# Patient Record
Sex: FEMALE | Race: WHITE | Employment: UNEMPLOYED | URBAN - METROPOLITAN AREA
[De-identification: names, ages, dates, MRNs, and addresses within clinical notes are randomized per-mention and may not be internally consistent; named-entity substitution may affect disease eponyms.]

---

## 2018-07-12 ENCOUNTER — OFFICE VISIT (OUTPATIENT)
Dept: INTERNAL MEDICINE CLINIC | Facility: CLINIC | Age: 18
End: 2018-07-12

## 2018-07-12 VITALS
RESPIRATION RATE: 14 BRPM | BODY MASS INDEX: 25.48 KG/M2 | DIASTOLIC BLOOD PRESSURE: 58 MMHG | HEIGHT: 69 IN | HEART RATE: 62 BPM | WEIGHT: 172 LBS | SYSTOLIC BLOOD PRESSURE: 104 MMHG | TEMPERATURE: 98 F

## 2018-07-12 DIAGNOSIS — N94.6 DYSMENORRHEA: Primary | ICD-10-CM

## 2018-07-12 PROCEDURE — 99203 OFFICE O/P NEW LOW 30 MIN: CPT | Performed by: INTERNAL MEDICINE

## 2018-07-12 RX ORDER — NORETHINDRONE ACETATE AND ETHINYL ESTRADIOL 1MG-20(21)
1 KIT ORAL DAILY
Qty: 3 PACKAGE | Refills: 3 | Status: SHIPPED | OUTPATIENT
Start: 2018-07-12 | End: 2019-06-18

## 2018-07-12 NOTE — PROGRESS NOTES
Cora Rivas is a 21year old female. Patient presents with:  Establish Care: AB RM 4 menstrual cramps      HPI:     Patient here for OCP for menstrual cramps. Usually the first few days is severe and then after that, pain is manageable.  She heard that rashes,no suspicious lesions  HEENT: atraumatic, normocephalic  NECK: supple,no adenopathy  LUNGS: normal rate without respiratory distress, lungs clear to auscultation  CARDIO: RRR nl S1 S2  GI: normal bowel sounds, no masses, HSM or tenderness  EXTREMITI

## 2019-06-17 ENCOUNTER — TELEPHONE (OUTPATIENT)
Dept: INTERNAL MEDICINE CLINIC | Facility: CLINIC | Age: 19
End: 2019-06-17

## 2019-06-17 NOTE — TELEPHONE ENCOUNTER
Future Appointments   Date Time Provider La Acevedo   7/2/2019  9:30 AM Lorena Aguirre MD [facility]     Pt has CPE with TB and would like BW orders sent to [facility].

## 2019-06-18 DIAGNOSIS — N94.6 DYSMENORRHEA: ICD-10-CM

## 2019-06-18 RX ORDER — NORETHINDRONE ACETATE AND ETHINYL ESTRADIOL AND FERROUS FUMARATE 1MG-20(21)
KIT ORAL
Qty: 84 TABLET | Refills: 0 | Status: SHIPPED | OUTPATIENT
Start: 2019-06-18 | End: 2019-07-02

## 2019-06-18 RX ORDER — NORETHINDRONE ACETATE AND ETHINYL ESTRADIOL 1MG-20(21)
1 KIT ORAL DAILY
Qty: 3 PACKAGE | Refills: 3 | OUTPATIENT
Start: 2019-06-18

## 2019-06-18 NOTE — TELEPHONE ENCOUNTER
Protocol failed due to PASS-PENDING LAST PAP WNL--VIA MANUAL LOOKUP    Please advise,    LOV:7/12/18 TB  FOV:7/2/19   LAST RX:7/12/18 1-20 mg-mcg take 1 tab daily 3 package 3 refills   LAST LABS:none   PER PROTOCOL: to provider

## 2019-07-02 ENCOUNTER — OFFICE VISIT (OUTPATIENT)
Dept: INTERNAL MEDICINE CLINIC | Facility: CLINIC | Age: 19
End: 2019-07-02
Payer: COMMERCIAL

## 2019-07-02 VITALS
HEIGHT: 69 IN | SYSTOLIC BLOOD PRESSURE: 110 MMHG | RESPIRATION RATE: 14 BRPM | HEART RATE: 84 BPM | WEIGHT: 168 LBS | BODY MASS INDEX: 24.88 KG/M2 | DIASTOLIC BLOOD PRESSURE: 68 MMHG

## 2019-07-02 DIAGNOSIS — Z00.00 ROUTINE GENERAL MEDICAL EXAMINATION AT A HEALTH CARE FACILITY: Primary | ICD-10-CM

## 2019-07-02 DIAGNOSIS — Z11.8 SCREENING FOR CHLAMYDIAL DISEASE: ICD-10-CM

## 2019-07-02 DIAGNOSIS — N94.6 DYSMENORRHEA: ICD-10-CM

## 2019-07-02 PROCEDURE — 87491 CHLMYD TRACH DNA AMP PROBE: CPT | Performed by: INTERNAL MEDICINE

## 2019-07-02 PROCEDURE — 99395 PREV VISIT EST AGE 18-39: CPT | Performed by: INTERNAL MEDICINE

## 2019-07-02 PROCEDURE — 87591 N.GONORRHOEAE DNA AMP PROB: CPT | Performed by: INTERNAL MEDICINE

## 2019-07-02 RX ORDER — NORETHINDRONE ACETATE AND ETHINYL ESTRADIOL 1MG-20(21)
1 KIT ORAL
Qty: 3 PACKAGE | Refills: 3 | Status: SHIPPED | OUTPATIENT
Start: 2019-07-02 | End: 2020-05-20

## 2019-07-02 NOTE — PROGRESS NOTES
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  Disp: 3 Package Rfl: 3       Allergies  No Known Allergies    Health Maintenance  Immunizations:    Immunization H given  Screening for chlamydial disease      Orders Placed This Encounter      Chlamydia/Gc Amplification [E]      Meds & Refills for this Visit:  Requested Prescriptions     Signed Prescriptions Disp Refills   • Norethin Ace-Eth Estrad-FE (Ernesto Rising FE 1/20)

## 2019-07-03 LAB
C TRACH DNA SPEC QL NAA+PROBE: NEGATIVE
N GONORRHOEA DNA SPEC QL NAA+PROBE: NEGATIVE

## 2019-07-05 ENCOUNTER — TELEPHONE (OUTPATIENT)
Dept: INTERNAL MEDICINE CLINIC | Facility: CLINIC | Age: 19
End: 2019-07-05

## 2019-07-05 DIAGNOSIS — Z13.21 SCREENING FOR ENDOCRINE, NUTRITIONAL, METABOLIC AND IMMUNITY DISORDER: ICD-10-CM

## 2019-07-05 DIAGNOSIS — Z13.29 SCREENING FOR THYROID DISORDER: ICD-10-CM

## 2019-07-05 DIAGNOSIS — Z13.228 SCREENING FOR ENDOCRINE, NUTRITIONAL, METABOLIC AND IMMUNITY DISORDER: ICD-10-CM

## 2019-07-05 DIAGNOSIS — Z13.0 SCREENING FOR ENDOCRINE, NUTRITIONAL, METABOLIC AND IMMUNITY DISORDER: ICD-10-CM

## 2019-07-05 DIAGNOSIS — Z13.220 ENCOUNTER FOR SCREENING FOR LIPID DISORDER: Primary | ICD-10-CM

## 2019-07-05 DIAGNOSIS — Z13.0 ENCOUNTER FOR SCREENING FOR DISEASES OF THE BLOOD AND BLOOD-FORMING ORGANS AND CERTAIN DISORDERS INVOLVING THE IMMUNE MECHANISM: ICD-10-CM

## 2019-07-05 DIAGNOSIS — Z13.29 SCREENING FOR ENDOCRINE, NUTRITIONAL, METABOLIC AND IMMUNITY DISORDER: ICD-10-CM

## 2019-07-05 NOTE — TELEPHONE ENCOUNTER
Called and spoke with pt's [facility] to inform, per TB, fasting CPE labs ordered as requested. To fast 12hrs prior to blood draw. Mother verbalized understanding and agreed with POC.

## 2019-07-05 NOTE — TELEPHONE ENCOUNTER
Per TB OV notes 7/2/19: Routine general medical examination at a health care facility  (primary encounter diagnosis)- Patient counseled on safe sex practices, seatbelt use, substance abuse, healthy diet and regular exercise. Check screen for chlamydia.  Dis

## 2019-07-05 NOTE — TELEPHONE ENCOUNTER
Pt mother Raymon Carlson called to let TB know that she discussed getting labs done for the patient and they wanted to let you know that if you think there are labs that she needs to do at this age ok to order them-just call mom back to let her know to get

## 2019-07-06 ENCOUNTER — TELEPHONE (OUTPATIENT)
Dept: INTERNAL MEDICINE CLINIC | Facility: CLINIC | Age: 19
End: 2019-07-06

## 2020-05-20 DIAGNOSIS — N94.6 DYSMENORRHEA: ICD-10-CM

## 2020-05-21 RX ORDER — NORETHINDRONE ACETATE AND ETHINYL ESTRADIOL 1MG-20(21)
1 KIT ORAL
Qty: 3 PACKAGE | Refills: 0 | Status: SHIPPED | OUTPATIENT
Start: 2020-05-21 | End: 2020-08-07

## 2020-05-21 NOTE — TELEPHONE ENCOUNTER
Last Ov: 7/2/19, TB, CPE  Last labs: G/C urine 7/2/19  Last Rx: Junel FE 1/20, #3 pack, 3R 7/2/19    No future appointments. Per Protocol - failed. Rx pending.

## 2020-08-07 DIAGNOSIS — N94.6 DYSMENORRHEA: ICD-10-CM

## 2020-08-07 RX ORDER — NORETHINDRONE ACETATE AND ETHINYL ESTRADIOL 1MG-20(21)
1 KIT ORAL
Qty: 1 PACKAGE | Refills: 0 | Status: SHIPPED | OUTPATIENT
Start: 2020-08-07 | End: 2020-08-31

## 2020-08-07 NOTE — TELEPHONE ENCOUNTER
Last Ov: 7/2/19, TB, CPE  Last labs: No labs completed  Last Rx: Junel FE, #3 pack, 0R 5/21/20    No future appointments. Per Protocol - failed, Rx pending.

## 2020-08-31 ENCOUNTER — OFFICE VISIT (OUTPATIENT)
Dept: INTERNAL MEDICINE CLINIC | Facility: CLINIC | Age: 20
End: 2020-08-31
Payer: COMMERCIAL

## 2020-08-31 VITALS
WEIGHT: 188 LBS | BODY MASS INDEX: 27.85 KG/M2 | DIASTOLIC BLOOD PRESSURE: 66 MMHG | HEIGHT: 69 IN | HEART RATE: 72 BPM | SYSTOLIC BLOOD PRESSURE: 112 MMHG | TEMPERATURE: 98 F

## 2020-08-31 DIAGNOSIS — N94.6 DYSMENORRHEA: ICD-10-CM

## 2020-08-31 DIAGNOSIS — Z12.4 SCREENING FOR CERVICAL CANCER: ICD-10-CM

## 2020-08-31 DIAGNOSIS — E04.9 ENLARGED THYROID: ICD-10-CM

## 2020-08-31 DIAGNOSIS — Z00.00 ROUTINE GENERAL MEDICAL EXAMINATION AT A HEALTH CARE FACILITY: Primary | ICD-10-CM

## 2020-08-31 DIAGNOSIS — Z11.3 SCREEN FOR STD (SEXUALLY TRANSMITTED DISEASE): ICD-10-CM

## 2020-08-31 PROCEDURE — 88175 CYTOPATH C/V AUTO FLUID REDO: CPT | Performed by: NURSE PRACTITIONER

## 2020-08-31 PROCEDURE — 3078F DIAST BP <80 MM HG: CPT | Performed by: NURSE PRACTITIONER

## 2020-08-31 PROCEDURE — 90715 TDAP VACCINE 7 YRS/> IM: CPT | Performed by: NURSE PRACTITIONER

## 2020-08-31 PROCEDURE — 87491 CHLMYD TRACH DNA AMP PROBE: CPT | Performed by: NURSE PRACTITIONER

## 2020-08-31 PROCEDURE — 3074F SYST BP LT 130 MM HG: CPT | Performed by: NURSE PRACTITIONER

## 2020-08-31 PROCEDURE — 87591 N.GONORRHOEAE DNA AMP PROB: CPT | Performed by: NURSE PRACTITIONER

## 2020-08-31 PROCEDURE — 3008F BODY MASS INDEX DOCD: CPT | Performed by: NURSE PRACTITIONER

## 2020-08-31 PROCEDURE — 99395 PREV VISIT EST AGE 18-39: CPT | Performed by: NURSE PRACTITIONER

## 2020-08-31 PROCEDURE — 90471 IMMUNIZATION ADMIN: CPT | Performed by: NURSE PRACTITIONER

## 2020-08-31 RX ORDER — NORETHINDRONE ACETATE AND ETHINYL ESTRADIOL 1MG-20(21)
1 KIT ORAL
Qty: 3 PACKAGE | Refills: 3 | Status: SHIPPED | OUTPATIENT
Start: 2020-08-31 | End: 2020-12-01

## 2020-08-31 NOTE — PROGRESS NOTES
Dawson Robbins is a 22year old female who presents for a complete physical exam:       Patient complains of:  Dysmenorrhea   Doing well with OCP  regualar light cycle  Predictable      Wt Readings from Last 6 Encounters:  08/31/20 : 188 lb (85.3 kg)  07/0 regular  Sexually Active:  Yes One partner      REVIEW OF SYSTEMS:   GENERAL: feels well otherwise  SKIN: denies any unusual skin lesions  EYES:denies blurred vision or double vision  HEENT: denies nasal congestion, sinus pain or ST  LUNGS: denies shortnes Refills   • Norethin Ace-Eth Estrad-FE (JUNEL FE 1/20) 1-20 MG-MCG Oral Tab 3 Package 3     Sig: Take 1 tablet by mouth once daily. Imaging & Consults:  None    No follow-ups on file. There are no Patient Instructions on file for this visit.

## 2020-09-02 PROBLEM — R87.612 LOW GRADE SQUAMOUS INTRAEPITHELIAL LESION (LGSIL) ON CERVICAL PAP SMEAR: Status: ACTIVE | Noted: 2020-09-02

## 2020-09-02 LAB
C TRACH DNA SPEC QL NAA+PROBE: NEGATIVE
N GONORRHOEA DNA SPEC QL NAA+PROBE: NEGATIVE

## 2020-10-09 ENCOUNTER — HOSPITAL ENCOUNTER (OUTPATIENT)
Dept: ULTRASOUND IMAGING | Age: 20
Discharge: HOME | End: 2020-10-09
Attending: NURSE PRACTITIONER
Payer: COMMERCIAL

## 2020-10-09 DIAGNOSIS — E04.9 ENLARGED THYROID: ICD-10-CM

## 2020-10-09 PROCEDURE — 76536 US EXAM OF HEAD AND NECK: CPT | Performed by: NURSE PRACTITIONER

## 2020-10-14 DIAGNOSIS — E04.1 THYROID NODULE: Primary | ICD-10-CM

## 2020-10-15 ENCOUNTER — TELEPHONE (OUTPATIENT)
Dept: INTERNAL MEDICINE CLINIC | Facility: CLINIC | Age: 20
End: 2020-10-15

## 2020-10-15 NOTE — TELEPHONE ENCOUNTER
Pt called  And stated that she saw the results of her Thyroid US. I informed her that SD was referring her to ENT and informed her of Dr. Doris Hill information.      Pt stated that she had no other questions or concerns and will call to make an appt with Ca

## 2020-11-09 ENCOUNTER — LAB ENCOUNTER (OUTPATIENT)
Dept: LAB | Facility: HOSPITAL | Age: 20
End: 2020-11-09
Attending: OTOLARYNGOLOGY
Payer: COMMERCIAL

## 2020-11-09 DIAGNOSIS — Z01.812 PRE-PROCEDURE LAB EXAM: Primary | ICD-10-CM

## 2020-11-11 ENCOUNTER — HOSPITAL ENCOUNTER (OUTPATIENT)
Dept: ULTRASOUND IMAGING | Facility: HOSPITAL | Age: 20
Discharge: HOME | End: 2020-11-11
Attending: OTOLARYNGOLOGY
Payer: COMMERCIAL

## 2020-11-11 DIAGNOSIS — E04.2 MULTINODULAR GOITER: ICD-10-CM

## 2020-11-11 PROCEDURE — 88173 CYTOPATH EVAL FNA REPORT: CPT | Performed by: OTOLARYNGOLOGY

## 2020-11-11 PROCEDURE — 10005 FNA BX W/US GDN 1ST LES: CPT | Performed by: OTOLARYNGOLOGY

## 2020-11-13 ENCOUNTER — OFFICE VISIT (OUTPATIENT)
Dept: FAMILY MEDICINE CLINIC | Facility: CLINIC | Age: 20
End: 2020-11-13
Payer: COMMERCIAL

## 2020-11-13 DIAGNOSIS — Z11.1 SCREENING-PULMONARY TB: Primary | ICD-10-CM

## 2020-11-13 PROCEDURE — 86580 TB INTRADERMAL TEST: CPT | Performed by: NURSE PRACTITIONER

## 2020-11-13 NOTE — PATIENT INSTRUCTIONS
You will need to return to clinic in 48-72 hours to have results of TB test read. Please return to clinic on Chris 11/15/20 between 2:45pm and 4:30pm or on Monday 11/16/20 between 8:00am and 2:45pm to have your TB test read.     If you do not return [facility]

## 2020-11-15 ENCOUNTER — OFFICE VISIT (OUTPATIENT)
Dept: FAMILY MEDICINE CLINIC | Facility: CLINIC | Age: 20
End: 2020-11-15
Payer: COMMERCIAL

## 2020-11-15 DIAGNOSIS — Z11.1 ENCOUNTER FOR PPD SKIN TEST READING: Primary | ICD-10-CM

## 2020-12-01 DIAGNOSIS — N94.6 DYSMENORRHEA: ICD-10-CM

## 2020-12-01 RX ORDER — NORETHINDRONE ACETATE AND ETHINYL ESTRADIOL 1MG-20(21)
1 KIT ORAL
Qty: 3 PACKAGE | Refills: 3 | Status: SHIPPED | OUTPATIENT
Start: 2020-12-01 | End: 2021-11-20

## 2020-12-05 ENCOUNTER — MED REC SCAN ONLY (OUTPATIENT)
Dept: INTERNAL MEDICINE CLINIC | Facility: CLINIC | Age: 20
End: 2020-12-05

## 2020-12-09 NOTE — PAT NURSING NOTE
Confirmed with [facility] in Dr Raquel Nunez office both procedures being performed on left side for 12/17

## 2020-12-11 NOTE — H&P
[facility] Patient Status:  Outpatient in a Bed    10/24/2000 MRN [id number]   [facility] SURGERY Attending Samantha Hazle MD   Hosp Day # 0 PCP Pradeep Workman MD     History of Present Illnes Problem List:     Dysmenorrhea     Enlarged thyroid     Low grade squamous intraepithelial lesion (LGSIL) on cervical Pap smear    US shows a 1.1cm left midpole thyroid nodule. FNA suggestive of papillary carcinoma.     Left midpole thyroid nodule suggesti

## 2020-12-15 ENCOUNTER — LAB ENCOUNTER (OUTPATIENT)
Dept: LAB | Age: 20
End: 2020-12-15
Attending: OTOLARYNGOLOGY
Payer: COMMERCIAL

## 2020-12-15 DIAGNOSIS — C73 PAPILLARY CARCINOMA OF THYROID (HCC): ICD-10-CM

## 2020-12-17 ENCOUNTER — HOSPITAL ENCOUNTER (OUTPATIENT)
Facility: HOSPITAL | Age: 20
Setting detail: HOSPITAL OUTPATIENT SURGERY
Discharge: HOME | End: 2020-12-17
Attending: OTOLARYNGOLOGY | Admitting: OTOLARYNGOLOGY
Payer: COMMERCIAL

## 2020-12-17 ENCOUNTER — ANESTHESIA EVENT (OUTPATIENT)
Dept: SURGERY | Facility: HOSPITAL | Age: 20
End: 2020-12-17
Payer: COMMERCIAL

## 2020-12-17 ENCOUNTER — ANESTHESIA (OUTPATIENT)
Dept: SURGERY | Facility: HOSPITAL | Age: 20
End: 2020-12-17
Payer: COMMERCIAL

## 2020-12-17 VITALS
HEIGHT: 69 IN | TEMPERATURE: 98 F | DIASTOLIC BLOOD PRESSURE: 65 MMHG | WEIGHT: 184.19 LBS | HEART RATE: 66 BPM | OXYGEN SATURATION: 99 % | RESPIRATION RATE: 16 BRPM | BODY MASS INDEX: 27.28 KG/M2 | SYSTOLIC BLOOD PRESSURE: 116 MMHG

## 2020-12-17 DIAGNOSIS — E04.9 ENLARGED THYROID: Primary | ICD-10-CM

## 2020-12-17 PROCEDURE — 0GBJ0ZZ EXCISION OF THYROID GLAND ISTHMUS, OPEN APPROACH: ICD-10-PCS | Performed by: OTOLARYNGOLOGY

## 2020-12-17 PROCEDURE — 81025 URINE PREGNANCY TEST: CPT | Performed by: OTOLARYNGOLOGY

## 2020-12-17 PROCEDURE — 0GBG0ZZ EXCISION OF LEFT THYROID GLAND LOBE, OPEN APPROACH: ICD-10-PCS | Performed by: OTOLARYNGOLOGY

## 2020-12-17 PROCEDURE — 88307 TISSUE EXAM BY PATHOLOGIST: CPT | Performed by: OTOLARYNGOLOGY

## 2020-12-17 RX ORDER — MIDAZOLAM HYDROCHLORIDE 1 MG/ML
INJECTION INTRAMUSCULAR; INTRAVENOUS AS NEEDED
Status: DISCONTINUED | OUTPATIENT
Start: 2020-12-17 | End: 2020-12-17 | Stop reason: SURG

## 2020-12-17 RX ORDER — METOCLOPRAMIDE HYDROCHLORIDE 5 MG/ML
10 INJECTION INTRAMUSCULAR; INTRAVENOUS AS NEEDED
Status: DISCONTINUED | OUTPATIENT
Start: 2020-12-17 | End: 2020-12-17

## 2020-12-17 RX ORDER — HYDROCODONE BITARTRATE AND ACETAMINOPHEN 5; 325 MG/1; MG/1
1 TABLET ORAL AS NEEDED
Status: COMPLETED | OUTPATIENT
Start: 2020-12-17 | End: 2020-12-17

## 2020-12-17 RX ORDER — LIDOCAINE HYDROCHLORIDE AND EPINEPHRINE 10; 10 MG/ML; UG/ML
INJECTION, SOLUTION INFILTRATION; PERINEURAL AS NEEDED
Status: DISCONTINUED | OUTPATIENT
Start: 2020-12-17 | End: 2020-12-17 | Stop reason: HOSPADM

## 2020-12-17 RX ORDER — HYDROCODONE BITARTRATE AND ACETAMINOPHEN 5; 325 MG/1; MG/1
1 TABLET ORAL EVERY 4 HOURS PRN
Status: DISCONTINUED | OUTPATIENT
Start: 2020-12-17 | End: 2020-12-17

## 2020-12-17 RX ORDER — NALOXONE HYDROCHLORIDE 0.4 MG/ML
80 INJECTION, SOLUTION INTRAMUSCULAR; INTRAVENOUS; SUBCUTANEOUS AS NEEDED
Status: DISCONTINUED | OUTPATIENT
Start: 2020-12-17 | End: 2020-12-17

## 2020-12-17 RX ORDER — LIDOCAINE HYDROCHLORIDE 10 MG/ML
INJECTION, SOLUTION EPIDURAL; INFILTRATION; INTRACAUDAL; PERINEURAL AS NEEDED
Status: DISCONTINUED | OUTPATIENT
Start: 2020-12-17 | End: 2020-12-17 | Stop reason: SURG

## 2020-12-17 RX ORDER — MIDAZOLAM HYDROCHLORIDE 1 MG/ML
INJECTION INTRAMUSCULAR; INTRAVENOUS
Status: COMPLETED
Start: 2020-12-17 | End: 2020-12-17

## 2020-12-17 RX ORDER — ONDANSETRON 2 MG/ML
INJECTION INTRAMUSCULAR; INTRAVENOUS AS NEEDED
Status: DISCONTINUED | OUTPATIENT
Start: 2020-12-17 | End: 2020-12-17 | Stop reason: SURG

## 2020-12-17 RX ORDER — ACETAMINOPHEN 325 MG/1
650 TABLET ORAL ONCE
Status: DISCONTINUED | OUTPATIENT
Start: 2020-12-17 | End: 2020-12-17

## 2020-12-17 RX ORDER — SODIUM CHLORIDE AND POTASSIUM CHLORIDE .9; .15 G/100ML; G/100ML
SOLUTION INTRAVENOUS
Status: COMPLETED
Start: 2020-12-17 | End: 2020-12-17

## 2020-12-17 RX ORDER — ONDANSETRON 2 MG/ML
INJECTION INTRAMUSCULAR; INTRAVENOUS
Status: COMPLETED
Start: 2020-12-17 | End: 2020-12-17

## 2020-12-17 RX ORDER — DEXAMETHASONE SODIUM PHOSPHATE 4 MG/ML
VIAL (ML) INJECTION AS NEEDED
Status: DISCONTINUED | OUTPATIENT
Start: 2020-12-17 | End: 2020-12-17 | Stop reason: SURG

## 2020-12-17 RX ORDER — ONDANSETRON 2 MG/ML
4 INJECTION INTRAMUSCULAR; INTRAVENOUS EVERY 6 HOURS PRN
Status: CANCELLED | OUTPATIENT
Start: 2020-12-17

## 2020-12-17 RX ORDER — SODIUM CHLORIDE, SODIUM LACTATE, POTASSIUM CHLORIDE, CALCIUM CHLORIDE 600; 310; 30; 20 MG/100ML; MG/100ML; MG/100ML; MG/100ML
INJECTION, SOLUTION INTRAVENOUS CONTINUOUS
Status: DISCONTINUED | OUTPATIENT
Start: 2020-12-17 | End: 2020-12-17

## 2020-12-17 RX ORDER — METOCLOPRAMIDE HYDROCHLORIDE 5 MG/ML
10 INJECTION INTRAMUSCULAR; INTRAVENOUS EVERY 6 HOURS PRN
Status: CANCELLED | OUTPATIENT
Start: 2020-12-17

## 2020-12-17 RX ORDER — ACETAMINOPHEN 500 MG
1000 TABLET ORAL ONCE
Status: DISCONTINUED | OUTPATIENT
Start: 2020-12-17 | End: 2020-12-17

## 2020-12-17 RX ORDER — ONDANSETRON 2 MG/ML
4 INJECTION INTRAMUSCULAR; INTRAVENOUS AS NEEDED
Status: DISCONTINUED | OUTPATIENT
Start: 2020-12-17 | End: 2020-12-17

## 2020-12-17 RX ORDER — MORPHINE SULFATE 4 MG/ML
8 INJECTION, SOLUTION INTRAMUSCULAR; INTRAVENOUS EVERY 2 HOUR PRN
Status: DISCONTINUED | OUTPATIENT
Start: 2020-12-17 | End: 2020-12-17

## 2020-12-17 RX ORDER — CALCITRIOL 0.25 UG/1
0.25 CAPSULE, LIQUID FILLED ORAL DAILY
Status: DISCONTINUED | OUTPATIENT
Start: 2020-12-17 | End: 2020-12-17

## 2020-12-17 RX ORDER — HYDROCODONE BITARTRATE AND ACETAMINOPHEN 5; 325 MG/1; MG/1
2 TABLET ORAL AS NEEDED
Status: COMPLETED | OUTPATIENT
Start: 2020-12-17 | End: 2020-12-17

## 2020-12-17 RX ORDER — SODIUM CHLORIDE, SODIUM LACTATE, POTASSIUM CHLORIDE, CALCIUM CHLORIDE 600; 310; 30; 20 MG/100ML; MG/100ML; MG/100ML; MG/100ML
INJECTION, SOLUTION INTRAVENOUS CONTINUOUS
Status: DISCONTINUED | OUTPATIENT
Start: 2020-12-17 | End: 2020-12-17 | Stop reason: HOSPADM

## 2020-12-17 RX ORDER — HYDROCODONE BITARTRATE AND ACETAMINOPHEN 5; 325 MG/1; MG/1
2 TABLET ORAL EVERY 4 HOURS PRN
Status: DISCONTINUED | OUTPATIENT
Start: 2020-12-17 | End: 2020-12-17

## 2020-12-17 RX ORDER — ROCURONIUM BROMIDE 10 MG/ML
INJECTION, SOLUTION INTRAVENOUS AS NEEDED
Status: DISCONTINUED | OUTPATIENT
Start: 2020-12-17 | End: 2020-12-17 | Stop reason: SURG

## 2020-12-17 RX ORDER — ACETAMINOPHEN 325 MG/1
650 TABLET ORAL EVERY 4 HOURS PRN
Status: DISCONTINUED | OUTPATIENT
Start: 2020-12-17 | End: 2020-12-17

## 2020-12-17 RX ORDER — METOCLOPRAMIDE HYDROCHLORIDE 5 MG/ML
INJECTION INTRAMUSCULAR; INTRAVENOUS
Status: COMPLETED
Start: 2020-12-17 | End: 2020-12-17

## 2020-12-17 RX ORDER — MORPHINE SULFATE 4 MG/ML
4 INJECTION, SOLUTION INTRAMUSCULAR; INTRAVENOUS EVERY 2 HOUR PRN
Status: DISCONTINUED | OUTPATIENT
Start: 2020-12-17 | End: 2020-12-17

## 2020-12-17 RX ORDER — SODIUM CHLORIDE AND POTASSIUM CHLORIDE .9; .15 G/100ML; G/100ML
SOLUTION INTRAVENOUS CONTINUOUS
Status: DISCONTINUED | OUTPATIENT
Start: 2020-12-17 | End: 2020-12-17

## 2020-12-17 RX ORDER — CALCIUM CARBONATE 200(500)MG
1000 TABLET,CHEWABLE ORAL 2 TIMES DAILY
Status: DISCONTINUED | OUTPATIENT
Start: 2020-12-17 | End: 2020-12-17

## 2020-12-17 RX ORDER — MIDAZOLAM HYDROCHLORIDE 1 MG/ML
1 INJECTION INTRAMUSCULAR; INTRAVENOUS EVERY 5 MIN PRN
Status: DISCONTINUED | OUTPATIENT
Start: 2020-12-17 | End: 2020-12-17

## 2020-12-17 RX ORDER — MORPHINE SULFATE 2 MG/ML
2 INJECTION, SOLUTION INTRAMUSCULAR; INTRAVENOUS EVERY 2 HOUR PRN
Status: DISCONTINUED | OUTPATIENT
Start: 2020-12-17 | End: 2020-12-17

## 2020-12-17 RX ORDER — MEPERIDINE HYDROCHLORIDE 25 MG/ML
12.5 INJECTION INTRAMUSCULAR; INTRAVENOUS; SUBCUTANEOUS AS NEEDED
Status: DISCONTINUED | OUTPATIENT
Start: 2020-12-17 | End: 2020-12-17

## 2020-12-17 RX ORDER — NORETHINDRONE ACETATE AND ETHINYL ESTRADIOL 1MG-20(21)
1 KIT ORAL
Status: DISCONTINUED | OUTPATIENT
Start: 2020-12-17 | End: 2020-12-17

## 2020-12-17 RX ORDER — ZOLPIDEM TARTRATE 5 MG/1
5 TABLET ORAL NIGHTLY PRN
Status: CANCELLED | OUTPATIENT
Start: 2020-12-17

## 2020-12-17 RX ORDER — HYDROMORPHONE HYDROCHLORIDE 1 MG/ML
0.4 INJECTION, SOLUTION INTRAMUSCULAR; INTRAVENOUS; SUBCUTANEOUS EVERY 5 MIN PRN
Status: DISCONTINUED | OUTPATIENT
Start: 2020-12-17 | End: 2020-12-17

## 2020-12-17 RX ORDER — HYDROMORPHONE HYDROCHLORIDE 1 MG/ML
INJECTION, SOLUTION INTRAMUSCULAR; INTRAVENOUS; SUBCUTANEOUS
Status: COMPLETED
Start: 2020-12-17 | End: 2020-12-17

## 2020-12-17 RX ADMIN — ROCURONIUM BROMIDE 5 MG: 10 INJECTION, SOLUTION INTRAVENOUS at 10:06:00

## 2020-12-17 RX ADMIN — LIDOCAINE HYDROCHLORIDE 50 MG: 10 INJECTION, SOLUTION EPIDURAL; INFILTRATION; INTRACAUDAL; PERINEURAL at 10:06:00

## 2020-12-17 RX ADMIN — DEXAMETHASONE SODIUM PHOSPHATE 4 MG: 4 MG/ML VIAL (ML) INJECTION at 10:10:00

## 2020-12-17 RX ADMIN — SODIUM CHLORIDE, SODIUM LACTATE, POTASSIUM CHLORIDE, CALCIUM CHLORIDE: 600; 310; 30; 20 INJECTION, SOLUTION INTRAVENOUS at 10:26:00

## 2020-12-17 RX ADMIN — ONDANSETRON 4 MG: 2 INJECTION INTRAMUSCULAR; INTRAVENOUS at 10:13:00

## 2020-12-17 RX ADMIN — MIDAZOLAM HYDROCHLORIDE 2 MG: 1 INJECTION INTRAMUSCULAR; INTRAVENOUS at 10:01:00

## 2020-12-17 NOTE — BRIEF OP NOTE
Pre-Operative Diagnosis: PAPILLARY CARCINOMA     Post-Operative Diagnosis: PAPILLARY CARCINOMA      Procedure Performed:   Procedure(s):  LEFT PARTIAL THYROIDECTOMY AND ISTHMUSECTOMY    Surgeon(s) and Role:     * Alberto Lobo MD - Primary     * Lorna Reyes

## 2020-12-17 NOTE — ANESTHESIA POSTPROCEDURE EVALUATION
[facility] Patient Status:  Outpatient in a Bed   Age/Gender 23year old female MRN [id number]   Location [address] Attending Graciela Rojas MD   Hosp Day # 0 PCP Teena dEge MD       Anesthesia

## 2020-12-17 NOTE — INTERVAL H&P NOTE
Pre-op Diagnosis: PAPILLARY CARCINOMA    The above referenced H&P was reviewed by Estrada Kahn MD on 12/17/2020, the patient was examined and no significant changes have occurred in the patient's condition since the H&P was performed.   I discussed wi

## 2020-12-17 NOTE — ANESTHESIA PREPROCEDURE EVALUATION
PRE-OP EVALUATION    Patient Name: Ronal Iverson    Pre-op Diagnosis: PAPILLARY CARCINOMA    Procedure(s):  LEFT THYROIDECTOMY AND ISTHMUSECTOMY    Surgeon(s) and Role:     * Tyron Mcconnell MD - Primary     * Linda Devries MD - Assisting Surgeon than monthly      Comment: cage done 7/2/19       Drug use: No     Available pre-op labs reviewed.                Airway      Mallampati: II    TM distance: > 6 cm  Neck ROM: full Cardiovascular      Rhythm: regular  Rate: normal     Dental    No notable de

## 2020-12-17 NOTE — OR NURSING
Iv removed. Instructions given. Family here and supportive. Awake and alert. Denies pain and nausea.

## 2020-12-18 NOTE — OPERATIVE REPORT
[facility]    PATIENT'S NAME: Anna Mariejo ann Reed   ATTENDING PHYSICIAN: Eladio Taipa M.D. OPERATING PHYSICIAN: Eladio Tapia M.D.    PATIENT ACCOUNT#:   [de-identified]    [facility]  MEDICAL RECORD #:   [id number] the trachea. I was then able to start coming down the left lobe of the thyroid gland, coming around the tumor. Care was taken to preserve as much normal thyroid tissue as possible on the left-hand side.   In this fashion, I performed a left partial thyroi

## 2021-01-30 ENCOUNTER — LAB ENCOUNTER (OUTPATIENT)
Dept: LAB | Age: 21
End: 2021-01-30
Attending: INTERNAL MEDICINE
Payer: COMMERCIAL

## 2021-01-30 DIAGNOSIS — E04.1 NONTOXIC UNINODULAR GOITER: ICD-10-CM

## 2021-01-30 LAB
T4 FREE SERPL-MCNC: 1.1 NG/DL (ref 0.8–1.7)
TSI SER-ACNC: 1.26 MIU/ML (ref 0.36–3.74)

## 2021-01-30 PROCEDURE — 36415 COLL VENOUS BLD VENIPUNCTURE: CPT

## 2021-01-30 PROCEDURE — 84439 ASSAY OF FREE THYROXINE: CPT

## 2021-01-30 PROCEDURE — 84443 ASSAY THYROID STIM HORMONE: CPT

## 2021-03-16 ENCOUNTER — HOSPITAL ENCOUNTER (OUTPATIENT)
Age: 21
Discharge: HOME | End: 2021-03-16
Payer: COMMERCIAL

## 2021-03-16 VITALS
RESPIRATION RATE: 16 BRPM | OXYGEN SATURATION: 100 % | HEART RATE: 79 BPM | SYSTOLIC BLOOD PRESSURE: 116 MMHG | WEIGHT: 173 LBS | DIASTOLIC BLOOD PRESSURE: 56 MMHG | BODY MASS INDEX: 25.62 KG/M2 | HEIGHT: 69 IN | TEMPERATURE: 98 F

## 2021-03-16 DIAGNOSIS — M79.675 TOE PAIN, BILATERAL: Primary | ICD-10-CM

## 2021-03-16 DIAGNOSIS — M79.674 TOE PAIN, BILATERAL: Primary | ICD-10-CM

## 2021-03-16 LAB
#MXD IC: 0.6 X10ˆ3/UL (ref 0.1–1)
CRP SERPL-MCNC: 5.37 MG/DL (ref ?–0.3)
HCT VFR BLD AUTO: 36.4 %
HGB BLD-MCNC: 12.4 G/DL
LYMPHOCYTES # BLD AUTO: 1.8 X10ˆ3/UL (ref 1–4)
LYMPHOCYTES NFR BLD AUTO: 21.3 %
MCH RBC QN AUTO: 30.2 PG (ref 26–34)
MCHC RBC AUTO-ENTMCNC: 34.1 G/DL (ref 31–37)
MCV RBC AUTO: 88.8 FL (ref 80–100)
MIXED CELL %: 6.8 %
NEUTROPHILS # BLD AUTO: 6.1 X10ˆ3/UL (ref 1.5–7.7)
NEUTROPHILS NFR BLD AUTO: 71.9 %
PLATELET # BLD AUTO: 223 X10ˆ3/UL (ref 150–450)
RBC # BLD AUTO: 4.1 X10ˆ6/UL
SED RATE-ML: 15 MM/HR
WBC # BLD AUTO: 8.5 X10ˆ3/UL (ref 4–11)

## 2021-03-16 PROCEDURE — 99213 OFFICE O/P EST LOW 20 MIN: CPT

## 2021-03-16 PROCEDURE — 99204 OFFICE O/P NEW MOD 45 MIN: CPT

## 2021-03-16 PROCEDURE — 36415 COLL VENOUS BLD VENIPUNCTURE: CPT

## 2021-03-16 PROCEDURE — 86140 C-REACTIVE PROTEIN: CPT | Performed by: NURSE PRACTITIONER

## 2021-03-16 PROCEDURE — 85025 COMPLETE CBC W/AUTO DIFF WBC: CPT | Performed by: NURSE PRACTITIONER

## 2021-03-16 PROCEDURE — 85652 RBC SED RATE AUTOMATED: CPT | Performed by: NURSE PRACTITIONER

## 2021-03-16 RX ORDER — NAPROXEN 500 MG/1
500 TABLET ORAL 2 TIMES DAILY PRN
Qty: 20 TABLET | Refills: 0 | Status: SHIPPED | OUTPATIENT
Start: 2021-03-16 | End: 2021-03-23

## 2021-03-16 RX ORDER — CEPHALEXIN 500 MG/1
500 CAPSULE ORAL 3 TIMES DAILY
Qty: 30 CAPSULE | Refills: 0 | Status: SHIPPED | OUTPATIENT
Start: 2021-03-16 | End: 2021-03-26

## 2021-03-17 NOTE — ED PROVIDER NOTES
Patient Seen in: [facility] [place]      History   Patient presents with: Toe Pain    Stated Complaint: Toes in pain 2 days    HPI/Subjective:   HPI  68-year-old female presents with redness, swelling, and pain since Sunday.   Patient states on S Vitals and nursing note reviewed. Constitutional:       General: She is not in acute distress. Appearance: She is well-developed. She is not ill-appearing or toxic-appearing. Cardiovascular:      Rate and Rhythm: Normal rate and regular rhythm. (500 mg total) by mouth 2 (two) times daily as needed. Qty: 20 tablet Refills: 0    cephALEXin 500 MG Oral Cap  Take 1 capsule (500 mg total) by mouth 3 (three) times daily for 10 days.   Qty: 30 capsule Refills: 0

## 2021-03-17 NOTE — ED INITIAL ASSESSMENT (HPI)
Pt with pain and swelling to all toes on B feet since Sunday; no injury    On a hot tub on Saturday; had covid in november

## 2021-03-19 ENCOUNTER — OFFICE VISIT (OUTPATIENT)
Dept: INTERNAL MEDICINE CLINIC | Facility: CLINIC | Age: 21
End: 2021-03-19
Payer: COMMERCIAL

## 2021-03-19 ENCOUNTER — LAB ENCOUNTER (OUTPATIENT)
Dept: LAB | Age: 21
End: 2021-03-19
Attending: INTERNAL MEDICINE
Payer: COMMERCIAL

## 2021-03-19 VITALS
BODY MASS INDEX: 26.66 KG/M2 | SYSTOLIC BLOOD PRESSURE: 108 MMHG | RESPIRATION RATE: 16 BRPM | HEART RATE: 67 BPM | TEMPERATURE: 98 F | HEIGHT: 69 IN | OXYGEN SATURATION: 99 % | WEIGHT: 180 LBS | DIASTOLIC BLOOD PRESSURE: 60 MMHG

## 2021-03-19 DIAGNOSIS — M79.89 PAIN AND SWELLING OF TOE OF RIGHT FOOT: ICD-10-CM

## 2021-03-19 DIAGNOSIS — M79.674 PAIN AND SWELLING OF TOE OF RIGHT FOOT: Primary | ICD-10-CM

## 2021-03-19 DIAGNOSIS — M79.675 PAIN AND SWELLING OF TOE OF LEFT FOOT: ICD-10-CM

## 2021-03-19 DIAGNOSIS — M79.89 PAIN AND SWELLING OF TOE OF LEFT FOOT: ICD-10-CM

## 2021-03-19 DIAGNOSIS — M79.674 PAIN AND SWELLING OF TOE OF RIGHT FOOT: ICD-10-CM

## 2021-03-19 DIAGNOSIS — M79.89 PAIN AND SWELLING OF TOE OF RIGHT FOOT: Primary | ICD-10-CM

## 2021-03-19 LAB — RHEUMATOID FACT SERPL-ACNC: <10 IU/ML (ref ?–15)

## 2021-03-19 PROCEDURE — 86431 RHEUMATOID FACTOR QUANT: CPT

## 2021-03-19 PROCEDURE — 36415 COLL VENOUS BLD VENIPUNCTURE: CPT

## 2021-03-19 PROCEDURE — 86200 CCP ANTIBODY: CPT

## 2021-03-19 PROCEDURE — 99213 OFFICE O/P EST LOW 20 MIN: CPT | Performed by: INTERNAL MEDICINE

## 2021-03-19 PROCEDURE — 86038 ANTINUCLEAR ANTIBODIES: CPT

## 2021-03-19 PROCEDURE — 3078F DIAST BP <80 MM HG: CPT | Performed by: INTERNAL MEDICINE

## 2021-03-19 PROCEDURE — 3008F BODY MASS INDEX DOCD: CPT | Performed by: INTERNAL MEDICINE

## 2021-03-19 PROCEDURE — 3074F SYST BP LT 130 MM HG: CPT | Performed by: INTERNAL MEDICINE

## 2021-03-19 NOTE — PROGRESS NOTES
Martin Rose is a 23year old female. Patient presents with:  Urgent Care F/u: Room # 4      HPI:     Patient with h/o thyroid cancer s/p partial thyroidectomy here for UC follow up.  She noticed swelling and pain of bilateral toes (toes 3-5) on 3/14/202 Grandmother         intestional        Allergies  No Known Allergies      REVIEW OF SYSTEMS:   GENERAL HEALTH:  no fevers or chills  RESPIRATORY: no cough  CARDIOVASCULAR: denies chest pain  GI: denies abdominal pain  : no dysuria  NEURO: denies headache

## 2021-03-22 LAB — ANA SCREEN: NEGATIVE

## 2021-03-23 LAB — CCP IGG SERPL-ACNC: 1.4 U/ML (ref 0–6.9)

## 2021-05-13 PROBLEM — C73 PAPILLARY THYROID CARCINOMA (HCC): Status: ACTIVE | Noted: 2021-05-13

## 2021-05-25 ENCOUNTER — HOSPITAL ENCOUNTER (OUTPATIENT)
Dept: ULTRASOUND IMAGING | Age: 21
Discharge: HOME | End: 2021-05-25
Attending: INTERNAL MEDICINE
Payer: COMMERCIAL

## 2021-05-25 ENCOUNTER — LAB ENCOUNTER (OUTPATIENT)
Dept: LAB | Age: 21
End: 2021-05-25
Attending: INTERNAL MEDICINE
Payer: COMMERCIAL

## 2021-05-25 DIAGNOSIS — C73 PAPILLARY THYROID CARCINOMA (HCC): ICD-10-CM

## 2021-05-25 PROCEDURE — 84443 ASSAY THYROID STIM HORMONE: CPT

## 2021-05-25 PROCEDURE — 36415 COLL VENOUS BLD VENIPUNCTURE: CPT

## 2021-05-25 PROCEDURE — 76536 US EXAM OF HEAD AND NECK: CPT | Performed by: INTERNAL MEDICINE

## 2021-05-25 PROCEDURE — 84439 ASSAY OF FREE THYROXINE: CPT

## 2021-05-25 PROCEDURE — 86800 THYROGLOBULIN ANTIBODY: CPT

## 2021-05-25 PROCEDURE — 84432 ASSAY OF THYROGLOBULIN: CPT

## 2021-07-20 ENCOUNTER — PATIENT MESSAGE (OUTPATIENT)
Dept: INTERNAL MEDICINE CLINIC | Facility: CLINIC | Age: 21
End: 2021-07-20

## 2021-07-22 ENCOUNTER — MED REC SCAN ONLY (OUTPATIENT)
Dept: INTERNAL MEDICINE CLINIC | Facility: CLINIC | Age: 21
End: 2021-07-22

## 2021-07-22 NOTE — TELEPHONE ENCOUNTER
Paperwork received from pt in regards to work physical exam  Last CPE with [address] [place] [address] 8/31/20, no upcoming appt scheduled.     Placed in [address] [place] [address] bin for review, please advise if OK to complete

## 2021-07-22 NOTE — TELEPHONE ENCOUNTER
From: Yael Mancilla  To: Tniy Sher MD  Sent: 7/20/2021 1:34 PM CDT  Subject: Non-Urgent Medical Question    Hi Dr. Ignacio Rahman! 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  They have a form I have to have filled out- \"Report of

## 2021-07-22 NOTE — TELEPHONE ENCOUNTER
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 Her [employer] PPO likely will allow CPE to be done calendar year instead of year to date. She should check with [employer] and we will get her in for CPE if she can have one.   Can you please chec

## 2021-07-22 NOTE — TELEPHONE ENCOUNTER
Last annual 8/31/2020; indicates insurance will cover annual year to date of last completed. Form needing to be completed prior to 8/16/2021.

## 2021-07-23 NOTE — TELEPHONE ENCOUNTER
Sent mychart and called no answer. Leaving in SD purple folder by computer for when pt answers back weather she wants mailed or .

## 2021-07-28 ENCOUNTER — PATIENT MESSAGE (OUTPATIENT)
Dept: INTERNAL MEDICINE CLINIC | Facility: CLINIC | Age: 21
End: 2021-07-28

## 2021-10-20 ENCOUNTER — TELEPHONE (OUTPATIENT)
Dept: INTERNAL MEDICINE CLINIC | Facility: CLINIC | Age: 21
End: 2021-10-20

## 2021-10-20 DIAGNOSIS — Z00.00 ROUTINE GENERAL MEDICAL EXAMINATION AT A HEALTH CARE FACILITY: Primary | ICD-10-CM

## 2021-10-20 DIAGNOSIS — Z13.0 SCREENING FOR DISORDER OF BLOOD AND BLOOD-FORMING ORGANS: ICD-10-CM

## 2021-10-20 DIAGNOSIS — Z13.220 SCREENING FOR LIPID DISORDERS: ICD-10-CM

## 2021-10-20 DIAGNOSIS — Z13.29 SCREENING FOR THYROID DISORDER: ICD-10-CM

## 2021-10-20 DIAGNOSIS — Z13.228 SCREENING FOR METABOLIC DISORDER: ICD-10-CM

## 2021-10-20 NOTE — TELEPHONE ENCOUNTER
Future Appointments   Date Time Provider La Garciai   11/20/2021 11:00 AM Jane Cardenas, APRN [facility]     Orders to [facility]- Pt informed that labs need to be completed no sooner than 2 weeks prior to the appt.  Pt aware to fast-no call ba

## 2021-11-20 ENCOUNTER — LAB ENCOUNTER (OUTPATIENT)
Dept: LAB | Age: 21
End: 2021-11-20
Attending: NURSE PRACTITIONER
Payer: COMMERCIAL

## 2021-11-20 ENCOUNTER — OFFICE VISIT (OUTPATIENT)
Dept: INTERNAL MEDICINE CLINIC | Facility: CLINIC | Age: 21
End: 2021-11-20
Payer: COMMERCIAL

## 2021-11-20 VITALS
RESPIRATION RATE: 16 BRPM | BODY MASS INDEX: 23.59 KG/M2 | HEART RATE: 78 BPM | TEMPERATURE: 99 F | OXYGEN SATURATION: 98 % | DIASTOLIC BLOOD PRESSURE: 62 MMHG | WEIGHT: 155.63 LBS | HEIGHT: 68 IN | SYSTOLIC BLOOD PRESSURE: 112 MMHG

## 2021-11-20 DIAGNOSIS — Z13.0 SCREENING FOR DISORDER OF BLOOD AND BLOOD-FORMING ORGANS: ICD-10-CM

## 2021-11-20 DIAGNOSIS — Z00.00 ROUTINE GENERAL MEDICAL EXAMINATION AT A HEALTH CARE FACILITY: ICD-10-CM

## 2021-11-20 DIAGNOSIS — Z13.220 SCREENING FOR LIPID DISORDERS: ICD-10-CM

## 2021-11-20 DIAGNOSIS — C73 PAPILLARY THYROID CARCINOMA (HCC): ICD-10-CM

## 2021-11-20 DIAGNOSIS — N94.6 DYSMENORRHEA: ICD-10-CM

## 2021-11-20 DIAGNOSIS — Z13.228 SCREENING FOR METABOLIC DISORDER: ICD-10-CM

## 2021-11-20 DIAGNOSIS — R87.612 LOW GRADE SQUAMOUS INTRAEPITHELIAL LESION (LGSIL) ON CERVICAL PAP SMEAR: ICD-10-CM

## 2021-11-20 DIAGNOSIS — Z13.29 SCREENING FOR THYROID DISORDER: ICD-10-CM

## 2021-11-20 DIAGNOSIS — Z12.4 SCREENING FOR CERVICAL CANCER: ICD-10-CM

## 2021-11-20 DIAGNOSIS — E04.9 ENLARGED THYROID: ICD-10-CM

## 2021-11-20 DIAGNOSIS — Z11.51 SCREENING FOR HUMAN PAPILLOMAVIRUS (HPV): ICD-10-CM

## 2021-11-20 DIAGNOSIS — Z00.00 ROUTINE GENERAL MEDICAL EXAMINATION AT A HEALTH CARE FACILITY: Primary | ICD-10-CM

## 2021-11-20 DIAGNOSIS — Z11.3 SCREEN FOR STD (SEXUALLY TRANSMITTED DISEASE): ICD-10-CM

## 2021-11-20 PROBLEM — M79.675 PAIN AND SWELLING OF TOE OF LEFT FOOT: Status: RESOLVED | Noted: 2021-03-19 | Resolved: 2021-11-20

## 2021-11-20 PROBLEM — M79.89 PAIN AND SWELLING OF TOE OF LEFT FOOT: Status: RESOLVED | Noted: 2021-03-19 | Resolved: 2021-11-20

## 2021-11-20 PROCEDURE — 88175 CYTOPATH C/V AUTO FLUID REDO: CPT | Performed by: NURSE PRACTITIONER

## 2021-11-20 PROCEDURE — 3078F DIAST BP <80 MM HG: CPT | Performed by: NURSE PRACTITIONER

## 2021-11-20 PROCEDURE — 36415 COLL VENOUS BLD VENIPUNCTURE: CPT

## 2021-11-20 PROCEDURE — 87624 HPV HI-RISK TYP POOLED RSLT: CPT | Performed by: NURSE PRACTITIONER

## 2021-11-20 PROCEDURE — 87491 CHLMYD TRACH DNA AMP PROBE: CPT | Performed by: NURSE PRACTITIONER

## 2021-11-20 PROCEDURE — 85025 COMPLETE CBC W/AUTO DIFF WBC: CPT

## 2021-11-20 PROCEDURE — 3008F BODY MASS INDEX DOCD: CPT | Performed by: NURSE PRACTITIONER

## 2021-11-20 PROCEDURE — 80053 COMPREHEN METABOLIC PANEL: CPT

## 2021-11-20 PROCEDURE — 80061 LIPID PANEL: CPT

## 2021-11-20 PROCEDURE — 3074F SYST BP LT 130 MM HG: CPT | Performed by: NURSE PRACTITIONER

## 2021-11-20 PROCEDURE — 99395 PREV VISIT EST AGE 18-39: CPT | Performed by: NURSE PRACTITIONER

## 2021-11-20 PROCEDURE — 87591 N.GONORRHOEAE DNA AMP PROB: CPT | Performed by: NURSE PRACTITIONER

## 2021-11-20 PROCEDURE — 84443 ASSAY THYROID STIM HORMONE: CPT

## 2021-11-20 RX ORDER — NORETHINDRONE ACETATE AND ETHINYL ESTRADIOL 1MG-20(21)
1 KIT ORAL
Qty: 84 TABLET | Refills: 3 | Status: SHIPPED | OUTPATIENT
Start: 2021-11-20 | End: 2021-11-27

## 2021-11-20 NOTE — PROGRESS NOTES
Traci Blankenship is a 19year old female who presents for a complete physical exam:         Patient complains of:    LSIL last year  Repeat pap this year. Hx thyroid cancer. Follows with Dr Laura Gongora. Dysmenorrhea  Does well with OCP.          Wt [place] 08/23/2004      Influenza             01/18/1999 03/04/1999 06/29/1999 08/26/2013      MMR                   12/07/1999 08/23/2004      Meningococcal-Menactra                          08/09/2013  06/15/2016      TDAP motion tenderness and no discharge. Bilateral adnexum display no mass, no tenderness and no fullness. Scant menstrual flow.    RECTAL: normal appearance  MUSCULOSKELETAL: back is not tender,  EXTREMITIES: no edema  NEURO: Oriented times three,cranial nerve

## 2021-11-27 DIAGNOSIS — N94.6 DYSMENORRHEA: ICD-10-CM

## 2021-11-27 RX ORDER — NORETHINDRONE ACETATE AND ETHINYL ESTRADIOL 1MG-20(21)
1 KIT ORAL
Qty: 84 TABLET | Refills: 3 | Status: SHIPPED | OUTPATIENT
Start: 2021-11-27

## 2021-11-29 ENCOUNTER — PATIENT MESSAGE (OUTPATIENT)
Dept: INTERNAL MEDICINE CLINIC | Facility: CLINIC | Age: 21
End: 2021-11-29

## 2021-11-30 NOTE — TELEPHONE ENCOUNTER
From: Marianne Going  To:  MAXIM Ojeda  Sent: 11/29/2021 6:09 PM CST  Subject: Prescription    Good evening,    I tried to  my Junel Fe Birth Control prescription from my [facility] [address] [address] in [place], and they said I was not able t

## 2021-12-30 ENCOUNTER — OFFICE VISIT (OUTPATIENT)
Dept: INTERNAL MEDICINE CLINIC | Facility: CLINIC | Age: 21
End: 2021-12-30
Payer: COMMERCIAL

## 2021-12-30 VITALS
HEIGHT: 68 IN | WEIGHT: 155.81 LBS | DIASTOLIC BLOOD PRESSURE: 62 MMHG | TEMPERATURE: 97 F | BODY MASS INDEX: 23.61 KG/M2 | SYSTOLIC BLOOD PRESSURE: 106 MMHG | HEART RATE: 64 BPM | RESPIRATION RATE: 12 BRPM | OXYGEN SATURATION: 100 %

## 2021-12-30 DIAGNOSIS — Z12.4 SCREENING FOR CERVICAL CANCER: Primary | ICD-10-CM

## 2021-12-30 PROCEDURE — 88175 CYTOPATH C/V AUTO FLUID REDO: CPT | Performed by: NURSE PRACTITIONER

## 2021-12-30 NOTE — PROGRESS NOTES
Here for pap only   Not enough cells on pap done at CPE 11/21. HPV negative. STD negative. LSIL 8/2020 with rec f/u 1 year.     REVIEW OF SYSTEMS:   GENERAL: feels well otherwise    : denies dysuria, vaginal discharge or itching      EXAM:   /62

## 2022-06-20 ENCOUNTER — OFFICE VISIT (OUTPATIENT)
Dept: FAMILY MEDICINE CLINIC | Facility: CLINIC | Age: 22
End: 2022-06-20

## 2022-06-20 VITALS
HEIGHT: 69 IN | DIASTOLIC BLOOD PRESSURE: 55 MMHG | SYSTOLIC BLOOD PRESSURE: 106 MMHG | HEART RATE: 101 BPM | WEIGHT: 145 LBS | TEMPERATURE: 98 F | RESPIRATION RATE: 15 BRPM | BODY MASS INDEX: 21.48 KG/M2 | OXYGEN SATURATION: 98 %

## 2022-06-20 DIAGNOSIS — Z02.1 PHYSICAL EXAM, PRE-EMPLOYMENT: Primary | ICD-10-CM

## 2022-06-22 ENCOUNTER — OFFICE VISIT (OUTPATIENT)
Dept: FAMILY MEDICINE CLINIC | Facility: CLINIC | Age: 22
End: 2022-06-22
Payer: COMMERCIAL

## 2022-06-22 DIAGNOSIS — Z11.1 ENCOUNTER FOR PPD SKIN TEST READING: Primary | ICD-10-CM

## 2022-06-22 LAB — INDURATION (): 0 MM (ref 0–11)

## 2022-09-29 DIAGNOSIS — N94.6 DYSMENORRHEA: ICD-10-CM

## 2022-09-29 RX ORDER — NORETHINDRONE ACETATE AND ETHINYL ESTRADIOL AND FERROUS FUMARATE 1MG-20(21)
KIT ORAL
Qty: 84 TABLET | Refills: 3 | OUTPATIENT
Start: 2022-09-29

## 2022-09-29 NOTE — TELEPHONE ENCOUNTER
Refill request denied per protocol. Patient received refill (see below):    Norethin Ace-Eth Estrad-FE (JUNEL FE 1/20) 1-20 MG-MCG Oral Tab 84 tablet 3 11/27/2021     Sig - Route: Take 1 tablet by mouth once daily. - Oral    Sent to pharmacy as: Norethin Ace-Eth Estrad-FE 1-20 MG-MCG Oral Tablet (Junel FE 1/20)    E-Prescribing Status: Receipt confirmed by pharmacy (11/27/2021 12:20 PM CST)             [address], [address] [place] [address] [phone], [phone]     Patient has enough medication until 11/27/2022. Patient is due for CPE/Pap in December. Will need to schedule an appointment to get CPE/PAP prior to receiving further refills. Please call patient to schedule Pap only appointment. Thank you.

## 2022-09-30 RX ORDER — NORETHINDRONE ACETATE AND ETHINYL ESTRADIOL AND FERROUS FUMARATE 1MG-20(21)
KIT ORAL
Qty: 84 TABLET | Refills: 0 | Status: SHIPPED | OUTPATIENT
Start: 2022-09-30

## 2022-09-30 NOTE — TELEPHONE ENCOUNTER
Birth Control refilled for 3 month's only ; patient is due for Pap in December. Needs to schedule appointment.

## 2022-10-05 NOTE — TELEPHONE ENCOUNTER
Future Appointments   Date Time Provider La Kristina   12/3/2022  9:40 AM Abelardo Lewis MD [facility]

## 2022-11-29 ENCOUNTER — LAB ENCOUNTER (OUTPATIENT)
Dept: LAB | Age: 22
End: 2022-11-29
Attending: NURSE PRACTITIONER
Payer: COMMERCIAL

## 2022-11-29 ENCOUNTER — OFFICE VISIT (OUTPATIENT)
Dept: INTERNAL MEDICINE CLINIC | Facility: CLINIC | Age: 22
End: 2022-11-29
Payer: COMMERCIAL

## 2022-11-29 VITALS
RESPIRATION RATE: 18 BRPM | HEIGHT: 69 IN | WEIGHT: 159 LBS | BODY MASS INDEX: 23.55 KG/M2 | SYSTOLIC BLOOD PRESSURE: 112 MMHG | OXYGEN SATURATION: 97 % | DIASTOLIC BLOOD PRESSURE: 66 MMHG | HEART RATE: 86 BPM

## 2022-11-29 DIAGNOSIS — Z13.1 SCREENING FOR DIABETES MELLITUS: ICD-10-CM

## 2022-11-29 DIAGNOSIS — R22.1 NECK MASS: ICD-10-CM

## 2022-11-29 DIAGNOSIS — Z12.4 SCREENING FOR CERVICAL CANCER: ICD-10-CM

## 2022-11-29 DIAGNOSIS — R87.612 LOW GRADE SQUAMOUS INTRAEPITHELIAL LESION (LGSIL) ON CERVICAL PAP SMEAR: ICD-10-CM

## 2022-11-29 DIAGNOSIS — Z11.3 SCREEN FOR STD (SEXUALLY TRANSMITTED DISEASE): ICD-10-CM

## 2022-11-29 DIAGNOSIS — E04.9 ENLARGED THYROID: ICD-10-CM

## 2022-11-29 DIAGNOSIS — Z85.850 HISTORY OF PAPILLARY ADENOCARCINOMA OF THYROID: ICD-10-CM

## 2022-11-29 DIAGNOSIS — E89.0 HISTORY OF PARTIAL THYROIDECTOMY: ICD-10-CM

## 2022-11-29 DIAGNOSIS — N94.6 DYSMENORRHEA: ICD-10-CM

## 2022-11-29 DIAGNOSIS — Z00.00 ROUTINE GENERAL MEDICAL EXAMINATION AT A HEALTH CARE FACILITY: Primary | ICD-10-CM

## 2022-11-29 DIAGNOSIS — Z11.51 SCREENING FOR HUMAN PAPILLOMAVIRUS (HPV): ICD-10-CM

## 2022-11-29 DIAGNOSIS — Z13.29 SCREENING FOR THYROID DISORDER: ICD-10-CM

## 2022-11-29 DIAGNOSIS — Z13.0 SCREENING FOR BLOOD DISEASE: ICD-10-CM

## 2022-11-29 LAB
ALBUMIN SERPL-MCNC: 3.8 G/DL (ref 3.4–5)
ALBUMIN/GLOB SERPL: 1.1 {RATIO} (ref 1–2)
ALP LIVER SERPL-CCNC: 67 U/L
ALT SERPL-CCNC: 25 U/L
ANION GAP SERPL CALC-SCNC: 4 MMOL/L (ref 0–18)
AST SERPL-CCNC: 20 U/L (ref 15–37)
BASOPHILS # BLD AUTO: 0.03 X10(3) UL (ref 0–0.2)
BASOPHILS NFR BLD AUTO: 0.5 %
BILIRUB SERPL-MCNC: 0.5 MG/DL (ref 0.1–2)
BUN BLD-MCNC: 18 MG/DL (ref 7–18)
BUN/CREAT SERPL: 19.1 (ref 10–20)
CALCIUM BLD-MCNC: 9.7 MG/DL (ref 8.5–10.1)
CHLORIDE SERPL-SCNC: 106 MMOL/L (ref 98–112)
CO2 SERPL-SCNC: 27 MMOL/L (ref 21–32)
CREAT BLD-MCNC: 0.94 MG/DL
DEPRECATED RDW RBC AUTO: 43.7 FL (ref 35.1–46.3)
EOSINOPHIL # BLD AUTO: 0.22 X10(3) UL (ref 0–0.7)
EOSINOPHIL NFR BLD AUTO: 3.5 %
ERYTHROCYTE [DISTWIDTH] IN BLOOD BY AUTOMATED COUNT: 12.8 % (ref 11–15)
FASTING STATUS PATIENT QL REPORTED: NO
GFR SERPLBLD BASED ON 1.73 SQ M-ARVRAT: 87 ML/MIN/1.73M2 (ref 60–?)
GLOBULIN PLAS-MCNC: 3.6 G/DL (ref 2.8–4.4)
GLUCOSE BLD-MCNC: 88 MG/DL (ref 70–99)
HCT VFR BLD AUTO: 44.2 %
HGB BLD-MCNC: 14.5 G/DL
IMM GRANULOCYTES # BLD AUTO: 0.01 X10(3) UL (ref 0–1)
IMM GRANULOCYTES NFR BLD: 0.2 %
LYMPHOCYTES # BLD AUTO: 2.21 X10(3) UL (ref 1–4)
LYMPHOCYTES NFR BLD AUTO: 35.1 %
MCH RBC QN AUTO: 30.4 PG (ref 26–34)
MCHC RBC AUTO-ENTMCNC: 32.8 G/DL (ref 31–37)
MCV RBC AUTO: 92.7 FL
MONOCYTES # BLD AUTO: 0.53 X10(3) UL (ref 0.1–1)
MONOCYTES NFR BLD AUTO: 8.4 %
NEUTROPHILS # BLD AUTO: 3.29 X10 (3) UL (ref 1.5–7.7)
NEUTROPHILS # BLD AUTO: 3.29 X10(3) UL (ref 1.5–7.7)
NEUTROPHILS NFR BLD AUTO: 52.3 %
OSMOLALITY SERPL CALC.SUM OF ELEC: 285 MOSM/KG (ref 275–295)
PLATELET # BLD AUTO: 254 10(3)UL (ref 150–450)
POTASSIUM SERPL-SCNC: 4.7 MMOL/L (ref 3.5–5.1)
PROT SERPL-MCNC: 7.4 G/DL (ref 6.4–8.2)
RBC # BLD AUTO: 4.77 X10(6)UL
SODIUM SERPL-SCNC: 137 MMOL/L (ref 136–145)
TSI SER-ACNC: 1.41 MIU/ML (ref 0.36–3.74)
WBC # BLD AUTO: 6.3 X10(3) UL (ref 4–11)

## 2022-11-29 PROCEDURE — 87591 N.GONORRHOEAE DNA AMP PROB: CPT | Performed by: NURSE PRACTITIONER

## 2022-11-29 PROCEDURE — 3008F BODY MASS INDEX DOCD: CPT | Performed by: NURSE PRACTITIONER

## 2022-11-29 PROCEDURE — 99395 PREV VISIT EST AGE 18-39: CPT | Performed by: NURSE PRACTITIONER

## 2022-11-29 PROCEDURE — 3074F SYST BP LT 130 MM HG: CPT | Performed by: NURSE PRACTITIONER

## 2022-11-29 PROCEDURE — 80050 GENERAL HEALTH PANEL: CPT | Performed by: NURSE PRACTITIONER

## 2022-11-29 PROCEDURE — 3078F DIAST BP <80 MM HG: CPT | Performed by: NURSE PRACTITIONER

## 2022-11-29 PROCEDURE — 87491 CHLMYD TRACH DNA AMP PROBE: CPT | Performed by: NURSE PRACTITIONER

## 2022-11-29 RX ORDER — NORETHINDRONE ACETATE AND ETHINYL ESTRADIOL 1MG-20(21)
1 KIT ORAL DAILY
Qty: 84 TABLET | Refills: 3 | Status: SHIPPED | OUTPATIENT
Start: 2022-11-29

## 2022-11-29 RX ORDER — RIBOFLAVIN (VITAMIN B2) 100 MG
100 TABLET ORAL DAILY
COMMUNITY

## 2022-12-01 ENCOUNTER — PATIENT MESSAGE (OUTPATIENT)
Dept: INTERNAL MEDICINE CLINIC | Facility: CLINIC | Age: 22
End: 2022-12-01

## 2022-12-01 ENCOUNTER — TELEPHONE (OUTPATIENT)
Dept: INTERNAL MEDICINE CLINIC | Facility: CLINIC | Age: 22
End: 2022-12-01

## 2022-12-01 DIAGNOSIS — A74.9 CHLAMYDIA: Primary | ICD-10-CM

## 2022-12-01 LAB
C TRACH DNA SPEC QL NAA+PROBE: POSITIVE
N GONORRHOEA DNA SPEC QL NAA+PROBE: NEGATIVE

## 2022-12-01 RX ORDER — DOXYCYCLINE HYCLATE 100 MG/1
100 CAPSULE ORAL 2 TIMES DAILY
Qty: 14 CAPSULE | Refills: 0 | Status: SHIPPED | OUTPATIENT
Start: 2022-12-01 | End: 2022-12-08

## 2022-12-01 NOTE — TELEPHONE ENCOUNTER
Spoke to patient  Reviewed results. Discussed test results, treatment, compliance and follow up   No intercourse for 10-14 days. She has had multiple partners over the past year but now in a relationship. She has told her partner and he is seeking testing/treatment. Will test 4 weeks and 3 mo. Orders placed.

## 2022-12-01 NOTE — TELEPHONE ENCOUNTER
Sera from [facility] calling to speak with Nurse or SD regarding Chlamydia lab results.

## 2022-12-01 NOTE — TELEPHONE ENCOUNTER
From: Neftali Bacon  To: MAXIM Berg  Sent: 12/1/2022 7:22 AM CST  Subject: Chlamydia Test Treatment    Hi there, I saw I received a positive result on my Chlamydia test and was wondering how I can go about treatment. Are there antibiotics that can be prescribed to me that I can  at my pharmacy? Thank you for your time!

## 2022-12-01 NOTE — TELEPHONE ENCOUNTER
Per result notes - Left message for patient to return my call with good times to call her back. Would you like to call her or respond here?

## 2023-01-06 ENCOUNTER — TELEPHONE (OUTPATIENT)
Dept: INTERNAL MEDICINE CLINIC | Facility: CLINIC | Age: 23
End: 2023-01-06

## 2023-01-06 ENCOUNTER — LAB ENCOUNTER (OUTPATIENT)
Dept: LAB | Age: 23
End: 2023-01-06
Attending: NURSE PRACTITIONER
Payer: COMMERCIAL

## 2023-01-06 DIAGNOSIS — A74.9 CHLAMYDIA: ICD-10-CM

## 2023-01-06 PROCEDURE — 87491 CHLMYD TRACH DNA AMP PROBE: CPT | Performed by: NURSE PRACTITIONER

## 2023-01-06 PROCEDURE — 87591 N.GONORRHOEAE DNA AMP PROB: CPT | Performed by: NURSE PRACTITIONER

## 2023-01-06 NOTE — TELEPHONE ENCOUNTER
[facility] Department, sent forms regarding patient's recent STD treatment. Faxed forms with confirmation and sent to scanning.

## 2023-01-09 LAB
C TRACH DNA SPEC QL NAA+PROBE: NEGATIVE
N GONORRHOEA DNA SPEC QL NAA+PROBE: NEGATIVE

## 2023-03-02 ENCOUNTER — OFFICE VISIT (OUTPATIENT)
Dept: INTERNAL MEDICINE CLINIC | Facility: CLINIC | Age: 23
End: 2023-03-02
Payer: COMMERCIAL

## 2023-03-02 VITALS
OXYGEN SATURATION: 98 % | BODY MASS INDEX: 23.6 KG/M2 | WEIGHT: 159.38 LBS | SYSTOLIC BLOOD PRESSURE: 122 MMHG | HEIGHT: 69 IN | DIASTOLIC BLOOD PRESSURE: 64 MMHG | HEART RATE: 85 BPM

## 2023-03-02 DIAGNOSIS — J32.0 CHRONIC MAXILLARY SINUSITIS: Primary | ICD-10-CM

## 2023-03-02 DIAGNOSIS — R05.3 PERSISTENT COUGH FOR 3 WEEKS OR LONGER: ICD-10-CM

## 2023-03-02 PROCEDURE — 99213 OFFICE O/P EST LOW 20 MIN: CPT | Performed by: INTERNAL MEDICINE

## 2023-03-02 PROCEDURE — 3078F DIAST BP <80 MM HG: CPT | Performed by: INTERNAL MEDICINE

## 2023-03-02 PROCEDURE — 3008F BODY MASS INDEX DOCD: CPT | Performed by: INTERNAL MEDICINE

## 2023-03-02 PROCEDURE — 3074F SYST BP LT 130 MM HG: CPT | Performed by: INTERNAL MEDICINE

## 2023-03-02 RX ORDER — AMOXICILLIN AND CLAVULANATE POTASSIUM 875; 125 MG/1; MG/1
1 TABLET, FILM COATED ORAL 2 TIMES DAILY
Qty: 20 TABLET | Refills: 0 | Status: SHIPPED | OUTPATIENT
Start: 2023-03-02 | End: 2023-03-12

## 2023-03-02 RX ORDER — AMOXICILLIN AND CLAVULANATE POTASSIUM 875; 125 MG/1; MG/1
1 TABLET, FILM COATED ORAL 2 TIMES DAILY
Qty: 20 TABLET | Refills: 0 | Status: SHIPPED | OUTPATIENT
Start: 2023-03-02 | End: 2023-03-02

## 2023-06-22 ENCOUNTER — TELEPHONE (OUTPATIENT)
Dept: INTERNAL MEDICINE CLINIC | Facility: CLINIC | Age: 23
End: 2023-06-22

## 2023-06-22 NOTE — TELEPHONE ENCOUNTER
Thyroid US results received. TB reviewed, normal study. [address] sent to pt with results. ([facility] active). Results sent to scan.

## 2023-09-25 NOTE — TELEPHONE ENCOUNTER
From: Jake Mcelroy  To: Layton Ocampo MD  Sent: 7/28/2021 6:18 PM CDT  Subject: Non-Urgent Medical Question    Evin Sky,    If you could mail the form to [address] [place] [address] that would be fantastic. Thank you very much!
Mailed form to pt per pt request. Sent copy of form to scan.
No

## 2023-10-27 DIAGNOSIS — N94.6 DYSMENORRHEA: ICD-10-CM

## 2023-10-27 RX ORDER — NORETHINDRONE ACETATE AND ETHINYL ESTRADIOL AND FERROUS FUMARATE 1MG-20(21)
1 KIT ORAL DAILY
Qty: 84 TABLET | Refills: 3 | Status: SHIPPED | OUTPATIENT
Start: 2023-10-27

## 2023-12-04 ENCOUNTER — TELEPHONE (OUTPATIENT)
Dept: INTERNAL MEDICINE CLINIC | Facility: CLINIC | Age: 23
End: 2023-12-04

## 2023-12-04 NOTE — TELEPHONE ENCOUNTER
Future Appointments   Date Time Provider La Garciai   1/15/2024  5:00 PM MAXIM Hanson [facility]     Orders to [facility]-Pt informed that labs need to be completed no sooner than 2 weeks prior to the appt.  Pt aware to fast-no call back required

## 2024-01-15 ENCOUNTER — OFFICE VISIT (OUTPATIENT)
Dept: INTERNAL MEDICINE CLINIC | Facility: CLINIC | Age: 24
End: 2024-01-15
Payer: COMMERCIAL

## 2024-01-15 VITALS
HEART RATE: 76 BPM | TEMPERATURE: 98 F | DIASTOLIC BLOOD PRESSURE: 60 MMHG | WEIGHT: 169.19 LBS | OXYGEN SATURATION: 99 % | SYSTOLIC BLOOD PRESSURE: 110 MMHG | BODY MASS INDEX: 25.06 KG/M2 | HEIGHT: 69 IN

## 2024-01-15 DIAGNOSIS — Z13.0 SCREENING FOR BLOOD DISEASE: ICD-10-CM

## 2024-01-15 DIAGNOSIS — E04.9 ENLARGED THYROID: ICD-10-CM

## 2024-01-15 DIAGNOSIS — Z85.850 HISTORY OF THYROID CANCER: ICD-10-CM

## 2024-01-15 DIAGNOSIS — Z00.00 ROUTINE GENERAL MEDICAL EXAMINATION AT A HEALTH CARE FACILITY: Primary | ICD-10-CM

## 2024-01-15 DIAGNOSIS — N94.6 DYSMENORRHEA: ICD-10-CM

## 2024-01-15 DIAGNOSIS — Z13.1 SCREENING FOR DIABETES MELLITUS: ICD-10-CM

## 2024-01-15 DIAGNOSIS — Z11.3 SCREEN FOR STD (SEXUALLY TRANSMITTED DISEASE): ICD-10-CM

## 2024-01-15 PROBLEM — Z87.42 HISTORY OF ABNORMAL CERVICAL PAP SMEAR: Status: ACTIVE | Noted: 2020-09-02

## 2024-01-15 PROCEDURE — 3078F DIAST BP <80 MM HG: CPT | Performed by: NURSE PRACTITIONER

## 2024-01-15 PROCEDURE — 87491 CHLMYD TRACH DNA AMP PROBE: CPT | Performed by: NURSE PRACTITIONER

## 2024-01-15 PROCEDURE — 90686 IIV4 VACC NO PRSV 0.5 ML IM: CPT | Performed by: NURSE PRACTITIONER

## 2024-01-15 PROCEDURE — 90471 IMMUNIZATION ADMIN: CPT | Performed by: NURSE PRACTITIONER

## 2024-01-15 PROCEDURE — 3008F BODY MASS INDEX DOCD: CPT | Performed by: NURSE PRACTITIONER

## 2024-01-15 PROCEDURE — 99395 PREV VISIT EST AGE 18-39: CPT | Performed by: NURSE PRACTITIONER

## 2024-01-15 PROCEDURE — 87591 N.GONORRHOEAE DNA AMP PROB: CPT | Performed by: NURSE PRACTITIONER

## 2024-01-15 PROCEDURE — 3074F SYST BP LT 130 MM HG: CPT | Performed by: NURSE PRACTITIONER

## 2024-01-15 RX ORDER — CHLORAL HYDRATE 500 MG
CAPSULE ORAL
COMMUNITY

## 2024-01-15 RX ORDER — NORETHINDRONE ACETATE AND ETHINYL ESTRADIOL 1MG-20(21)
1 KIT ORAL DAILY
Qty: 84 TABLET | Refills: 3 | Status: SHIPPED | OUTPATIENT
Start: 2024-01-15

## 2024-01-15 NOTE — PROGRESS NOTES
Kylee Mcgowan is a 23 year old female who presents for a complete physical exam:       Patient complains of:      Thyroid:  hx papillary thyroid cancer.   Dr Pascual and Dr Morris   will transition to Endo at [facility] if needed.      Hx of partial thyroidectomy 2020 with left partial thyroidectomy with isthmusectomy.  No meds.    She is not having any issues.  Will message Dr Pascual     Hx abnormal pap 2020.  Normal 2021 and 2022.   Per ASCCP guidelines routine screening.      Dysmenorrhea  OCP  doing well.      Wt Readings from Last 6 Encounters:   01/15/24 169 lb 3.2 oz (76.7 kg)   03/02/23 159 lb 6.4 oz (72.3 kg)   11/29/22 159 lb (72.1 kg)   06/20/22 145 lb (65.8 kg)   12/30/21 155 lb 12.8 oz (70.7 kg)   11/20/21 155 lb 9.6 oz (70.6 kg)       Cholesterol, Total (mg/dL)   Date Value   11/20/2021 148     HDL Cholesterol (mg/dL)   Date Value   11/20/2021 55     LDL Cholesterol (mg/dL)   Date Value   11/20/2021 79     AST (U/L)   Date Value   11/29/2022 20   11/20/2021 18     ALT (U/L)   Date Value   11/29/2022 25   11/20/2021 25        Current Outpatient Medications   Medication Sig Dispense Refill    omega-3 fatty acids 1000 MG Oral Cap       Norethin Ace-Eth Estrad-FE (JUNEL FE 1/20) 1-20 MG-MCG Oral Tab Take 1 tablet by mouth daily. 84 tablet 3    Multiple Vitamin (MULTI-VITAMIN) Oral Tab Take 1 tablet by mouth daily.        Past Medical History:   Diagnosis Date    Cancer (HCC)     thyroid    Disorder of thyroid     Visual impairment     glasses      Past Surgical History:   Procedure Laterality Date    THYROIDECTOMY      partial thyroidectomy - 12/2020    WISDOM TEETH REMOVED  2016      Family History   Problem Relation Age of Onset    Cancer Paternal Grandmother         intestional           Health Maintenance  Immunizations:   Immunization History   Administered Date(s) Administered    Covid-19 Vaccine Pfizer 30 mcg/0.3 ml 01/27/2021, 02/17/2021, 10/30/2021    Covid-19 Vaccine Pfizer Bivalent 30mcg/0.3mL  10/13/2022    DTP 01/18/1999, 01/18/1999, 04/13/1999, 04/13/1999, 06/29/1999, 06/29/1999, 08/03/2000, 08/03/2000, 08/23/2004, 08/23/2004    FLUZONE 6 months and older PFS 0.5 ml (43880) 10/30/2021    Flucelvax 0.5ml Vaccine 10/13/2022    HEP B 12/07/1998, 03/04/1999, 09/03/1999    Hpv Virus Vaccine [address] [facility] 08/31/2011, 08/26/2013, 06/15/2016    IPV 01/18/1999, 04/13/1999, 08/13/2000, 08/23/2004    Influenza 01/18/1999, 03/04/1999, 06/29/1999, 08/26/2013, 11/03/2022    MMR 12/07/1999, 08/23/2004    Meningococcal-Menactra 08/09/2013, 06/15/2016    TDAP 07/22/2010, 08/31/2020    Tb Intradermal Test 11/13/2020, 06/20/2022    Varicella 12/07/1999, 07/22/2010   Pended Date(s) Pended    FLUZONE 6 months and older PFS 0.5 ml (73243) 01/15/2024     Dental Visits: yes   Colon cancer screening:  No recommendations at this time   Gyne:     Health Maintenance   Topic Date Due    Pap Smear  11/29/2023            History of dysplasia:  Yes  Menstrual Cycle: OCP  end of December early Jeanmarie             Breast Cancer Screening: na No recommendations at this time     Bone Density:  na     Osteoperosis Prevention:    Osteoperosis Prevention was discussed.  Reviewed Calcium, Vitamin D supplementation and Weight Bearing Exercises.    Patient is performing weight bearing exercises.  Patient is currently taking Vitamin D supplementation.        REVIEW OF SYSTEMS:   GENERAL: feels well otherwise  SKIN: denies any unusual skin lesions  EYES:denies blurred vision or double vision  HEENT: denies nasal congestion, sinus pain or ST  LUNGS: denies shortness of breath with exertion  CARDIOVASCULAR: denies chest pain on exertion  GI: denies abdominal pain,denies heartburn  : denies dysuria, vaginal discharge or itching  MUSCULOSKELETAL: denies back pain  NEURO: denies headaches  PSYCH: no c/o      EXAM:   /60 (BP Location: Left arm, Patient Position: Sitting, Cuff Size: adult)   Pulse 76   Temp 97.8 °F (36.6 °C) (Temporal)   Ht 5' 9\"  (1.753 m)   Wt 169 lb 3.2 oz (76.7 kg)   LMP 12/30/2023 (Approximate)   SpO2 99%   BMI 24.99 kg/m²   Body mass index is 24.99 kg/m².   GENERAL: well developed, well nourished,in no apparent distress  SKIN: no rashes,no suspicious lesions  HEENT: atraumatic, normocephalic,ears and throat are clear  EYES:PERRLA, EOMI, conjunctiva are clear  NECK: supple,no adenopathy,  LUNGS: clear to auscultation  CARDIO: RRR without murmur  GI: good BS's,no masses, HSM or tenderness  MUSCULOSKELETAL: back is not tender,  EXTREMITIES: no edema  NEURO: Oriented times three,cranial nerves are intact,motor and sensory are grossly intact    ASSESSMENT AND PLAN:      HEALTH MAINTENANCE:  labs ordered   flu today.  [facility] today.      Encounter Diagnoses   Name Primary?    Routine general medical examination at a health care facility Yes    Dysmenorrhea  refill OCP  [facility] only giving 30 days as she likely needs to use [facility].  Send there and she will call if issues.       History of thyroid cancer  stable  check US in June 2024.      Enlarged thyroid     Screening for diabetes mellitus     Screening for blood disease     Screen for STD (sexually transmitted disease)        Orders Placed This Encounter   Procedures    CBC With Differential With Platelet    Comp Metabolic Panel    TSH W Reflex To Free T4    Fluzone Quadrivalent 6mo+ 0.5mL    Chlamydia/Gc Amplification [E]       Meds & Refills for this Visit:  Requested Prescriptions     Signed Prescriptions Disp Refills    Norethin Ace-Eth Estrad-FE (JUNEL FE 1/20) 1-20 MG-MCG Oral Tab 84 tablet 3     Sig: Take 1 tablet by mouth daily.       Imaging & Consults:  INFLUENZA VAC, QUAD, PRSV FREE, 0.5 ML  US THYROID (CPT=76536)    No follow-ups on file.  There are no Patient Instructions on file for this visit.

## 2024-01-16 ENCOUNTER — LAB ENCOUNTER (OUTPATIENT)
Dept: LAB | Age: 24
End: 2024-01-16
Attending: INTERNAL MEDICINE
Payer: COMMERCIAL

## 2024-01-16 DIAGNOSIS — Z13.0 SCREENING FOR BLOOD DISEASE: ICD-10-CM

## 2024-01-16 DIAGNOSIS — Z85.850 HISTORY OF THYROID CANCER: ICD-10-CM

## 2024-01-16 DIAGNOSIS — Z13.1 SCREENING FOR DIABETES MELLITUS: ICD-10-CM

## 2024-01-16 LAB
ALBUMIN SERPL-MCNC: 3.7 G/DL (ref 3.4–5)
ALBUMIN/GLOB SERPL: 1 {RATIO} (ref 1–2)
ALP LIVER SERPL-CCNC: 62 U/L
ALT SERPL-CCNC: 22 U/L
ANION GAP SERPL CALC-SCNC: 2 MMOL/L (ref 0–18)
AST SERPL-CCNC: 22 U/L (ref 15–37)
BASOPHILS # BLD AUTO: 0.03 X10(3) UL (ref 0–0.2)
BASOPHILS NFR BLD AUTO: 0.4 %
BILIRUB SERPL-MCNC: 0.3 MG/DL (ref 0.1–2)
BUN BLD-MCNC: 19 MG/DL (ref 9–23)
C TRACH DNA SPEC QL NAA+PROBE: NEGATIVE
CALCIUM BLD-MCNC: 8.9 MG/DL (ref 8.5–10.1)
CHLORIDE SERPL-SCNC: 108 MMOL/L (ref 98–112)
CO2 SERPL-SCNC: 29 MMOL/L (ref 21–32)
CREAT BLD-MCNC: 1.04 MG/DL
EGFRCR SERPLBLD CKD-EPI 2021: 76 ML/MIN/1.73M2 (ref 60–?)
EOSINOPHIL # BLD AUTO: 0.08 X10(3) UL (ref 0–0.7)
EOSINOPHIL NFR BLD AUTO: 1 %
ERYTHROCYTE [DISTWIDTH] IN BLOOD BY AUTOMATED COUNT: 12.6 %
FASTING STATUS PATIENT QL REPORTED: NO
GLOBULIN PLAS-MCNC: 3.6 G/DL (ref 2.8–4.4)
GLUCOSE BLD-MCNC: 115 MG/DL (ref 70–99)
HCT VFR BLD AUTO: 42.2 %
HGB BLD-MCNC: 14.3 G/DL
IMM GRANULOCYTES # BLD AUTO: 0.03 X10(3) UL (ref 0–1)
IMM GRANULOCYTES NFR BLD: 0.4 %
LYMPHOCYTES # BLD AUTO: 1.48 X10(3) UL (ref 1–4)
LYMPHOCYTES NFR BLD AUTO: 18.5 %
MCH RBC QN AUTO: 30.8 PG (ref 26–34)
MCHC RBC AUTO-ENTMCNC: 33.9 G/DL (ref 31–37)
MCV RBC AUTO: 90.8 FL
MONOCYTES # BLD AUTO: 0.58 X10(3) UL (ref 0.1–1)
MONOCYTES NFR BLD AUTO: 7.3 %
N GONORRHOEA DNA SPEC QL NAA+PROBE: NEGATIVE
NEUTROPHILS # BLD AUTO: 5.78 X10 (3) UL (ref 1.5–7.7)
NEUTROPHILS # BLD AUTO: 5.78 X10(3) UL (ref 1.5–7.7)
NEUTROPHILS NFR BLD AUTO: 72.4 %
OSMOLALITY SERPL CALC.SUM OF ELEC: 291 MOSM/KG (ref 275–295)
PLATELET # BLD AUTO: 233 10(3)UL (ref 150–450)
POTASSIUM SERPL-SCNC: 3.8 MMOL/L (ref 3.5–5.1)
PROT SERPL-MCNC: 7.3 G/DL (ref 6.4–8.2)
RBC # BLD AUTO: 4.65 X10(6)UL
SODIUM SERPL-SCNC: 139 MMOL/L (ref 136–145)
TSI SER-ACNC: 1.66 MIU/ML (ref 0.36–3.74)
WBC # BLD AUTO: 8 X10(3) UL (ref 4–11)

## 2024-01-16 PROCEDURE — 80053 COMPREHEN METABOLIC PANEL: CPT

## 2024-01-16 PROCEDURE — 84443 ASSAY THYROID STIM HORMONE: CPT

## 2024-01-16 PROCEDURE — 85025 COMPLETE CBC W/AUTO DIFF WBC: CPT

## 2024-01-18 ENCOUNTER — PATIENT MESSAGE (OUTPATIENT)
Dept: INTERNAL MEDICINE CLINIC | Facility: CLINIC | Age: 24
End: 2024-01-18

## 2024-01-22 NOTE — TELEPHONE ENCOUNTER
From: Kylee Mcgowan  To: Nataliachristine Vaughn  Sent: 1/18/2024 8:30 PM CST  Subject: Birth Control Prescription    Hi there, I have been taking Junel Fe for as long as I’ve been taking birth control, but I just elected to have my prescription shipped and I received Marilia Fe. I checked and they are the same medication and the same strength, but I just wanted to make sure there wouldn’t be any side effects or changes with the efficiency of my birth control. Thank you for your time!

## 2024-07-16 ENCOUNTER — PATIENT MESSAGE (OUTPATIENT)
Dept: INTERNAL MEDICINE CLINIC | Facility: CLINIC | Age: 24
End: 2024-07-16

## 2024-07-18 NOTE — TELEPHONE ENCOUNTER
Yes  ok to cancel and please apologize for me to Dejah that she got the letter.  Thank you for her response.

## 2024-07-18 NOTE — TELEPHONE ENCOUNTER
US completed at [facility], results in chart.     Connie Vaughn- ok to cancel US order that was placed 1/15/24?

## 2024-07-18 NOTE — TELEPHONE ENCOUNTER
From: Kylee Mcgowan  To: Natalia Vaughn  Sent: 7/16/2024 10:24 AM CDT  Subject: Letter from Dr. Vaughn 7/16/24    Good morning,    I just received a letter from Dr. Vaughn about outstanding lab work needing to be done. I had gotten my Thyroid Ultrasound done in June and the lab results were posted in my MyChart. I was just wondering if there was anything else I had to do for the US THYROID (CPT=76536) DR khoury. Thank you for your time!    Dejah Mcgowan

## 2024-12-31 DIAGNOSIS — N94.6 DYSMENORRHEA: ICD-10-CM

## 2025-01-02 RX ORDER — NORETHINDRONE ACETATE AND ETHINYL ESTRADIOL AND FERROUS FUMARATE 1MG-20(21)
1 KIT ORAL DAILY
Qty: 84 TABLET | Refills: 0 | Status: SHIPPED | OUTPATIENT
Start: 2025-01-02

## 2025-01-02 NOTE — TELEPHONE ENCOUNTER
LOV 1/15/24    Last refill  Norethin Ace-Eth Estrad-FE (JUNEL FE 1/20) 1-20 MG-MCG Oral Tab 84 tablet 3 1/15/2024 --   Sig:   Take 1 tablet by mouth daily.       No future appts.    Rx pended for your approval if ok.  Please review and advise. Thank you.

## 2025-01-20 ENCOUNTER — OFFICE VISIT (OUTPATIENT)
Facility: CLINIC | Age: 25
End: 2025-01-20
Payer: COMMERCIAL

## 2025-01-20 VITALS
HEIGHT: 69 IN | DIASTOLIC BLOOD PRESSURE: 60 MMHG | SYSTOLIC BLOOD PRESSURE: 112 MMHG | HEART RATE: 83 BPM | OXYGEN SATURATION: 100 % | WEIGHT: 169 LBS | RESPIRATION RATE: 18 BRPM | BODY MASS INDEX: 25.03 KG/M2

## 2025-01-20 DIAGNOSIS — E89.0 S/P PARTIAL THYROIDECTOMY: ICD-10-CM

## 2025-01-20 DIAGNOSIS — Z85.850 HISTORY OF THYROID CANCER: Primary | ICD-10-CM

## 2025-01-20 NOTE — H&P
Endocrinology Clinic Note    Name: Kylee Mcgowan    Date: 1/20/2025       HISTORY OF PRESENT ILLNESS   Kylee Mcgowan is a 24 year old female with PMHx significant for papillary thyroid cancer status post partial thyroidectomy in 2020 who presents for initial endocrine consultation for papillary thyroid cancer.  The patient reports she was found to have PTC in 2020 after a nodules palpated and biopsy subsequently showed PTC.  She underwent a partial left lobectomy with ENT which was apparently low risk as completion thyroidectomy was not performed.  She has had serial ultrasound showing no nodules in the remaining thyroid tissue.  She has had no postsurgical hypothyroidism and is not currently on any medications.  Further data as below.      PAST MEDICAL HISTORY:   Past Medical History:    Cancer (HCC)    thyroid    Disorder of thyroid    Visual impairment    glasses       PAST SURGICAL HISTORY:   Past Surgical History:   Procedure Laterality Date    Thyroidectomy      partial thyroidectomy - 12/2020    [place] teeth removed  2016       CURRENT MEDICATIONS:    Current Outpatient Medications   Medication Sig Dispense Refill    Norethin Ace-Eth Estrad-FE (SHAWNA JAYSON 1/20) 1-20 MG-MCG Oral Tab TAKE 1 TABLET DAILY 84 tablet 0    Multiple Vitamin (MULTI-VITAMIN) Oral Tab Take 1 tablet by mouth daily.           ALLERGIES:  Allergies[1]    SOCIAL HISTORY:    Social History     Socioeconomic History    Marital status: Single   Tobacco Use    Smoking status: Never    Smokeless tobacco: Never   Vaping Use    Vaping status: Never Used   Substance and Sexual Activity    Alcohol use: Yes     Alcohol/week: 1.0 standard drink of alcohol     Types: 1 Standard drinks or equivalent per week     Comment: cage done 7/2/19     Drug use: No    Sexual activity: Yes     Partners: Male       FAMILY HISTORY:   Family History   Problem Relation Age of Onset    Cancer Paternal Grandmother         intestional         REVIEW OF SYSTEMS:  Ten  point review of systems has been performed and is otherwise negative and/or non-contributory, except as described above.      PHYSICAL EXAM:   Vitals:    01/20/25 1422   BP: 112/60   Pulse: 83   Resp: 18   SpO2: 100%   Weight: 169 lb (76.7 kg)   Height: 5' 9\" (1.753 m)     BMI: Body mass index is 24.96 kg/m².     CONSTITUTIONAL:  awake, alert, cooperative, no apparent distress, and appears stated age  PSYCH: normal affect  EYES:  No proptosis,  conjunctiva normal  ENT:  Normocephalic, atraumatic  NECK:  Supple, symmetrical, thyroid not enlarged and no tenderness  LUNGS: breathing comfortably  CARDIOVASCULAR:  regular rate   ABDOMEN:  soft  SKIN:  no rashes and no lesions  EXTREMITIES: no edema      DATA:     Pertinent data reviewed 1/20/2025.    Case Report     Surgical Pathology                                Case: [id number]                                     Authorizing Provider:  Simone Pascual MD     Collected:           12/17/2020 10:46 AM             Ordering Location:     [facility]    Received:            12/17/2020 12:24 PM             Pathologist:           Karey Trimble MD                                                             Specimen:    Thyroid left, LEFT PARTIAL THYROID LOBE AND ISTHMUS                                            Final Diagnosis:     Excision, left partial thyroid lobe and isthmus:   -POSITIVE for papillary thyroid carcinoma, 1.6 cm in maximal dimension.  -Tumor focally extends to the inked surface of the specimen.  -No extrathyroidal extension identified.  -One perithyroidal lymph node, negative for tumor (0/1 lymph node).      Electronically signed by Karey Trimble MD on 12/22/2020 at  4:21 PM        Final Diagnosis Comment      Case also seen by Dr. PAM Daniels, who concurs.      Synoptic Report     THYROID GLAND   8th Edition - Protocol posted: 8/28/2019Thyroid - All Specimens       SPECIMEN   Procedure  Left partial thyroid lobe and isthmus   TUMOR   Tumor  Focality  Unifocal   Tumor Characteristics     Tumor Site  Left lobe   Histologic Type  Papillary carcinoma, classic (usual, conventional)   Tumor Size (Centimeters)  Greatest Dimension (Centimeters): 1.6 cm   Extrathyroidal Extension  Not identified   Angioinvasion (vascular invasion)  Not identified   Lymphatic Invasion  Not identified   Perineural Invasion  Not identified   Margins  Involved by carcinoma   Site(s) of Involvement  Tumor very focally extends to the inked surface in the left thyroid lobe   LYMPH NODES   Number of Lymph Nodes Involved  0   Number of Lymph Nodes Examined  1   Kevin Levels Examined  Level VI   PATHOLOGIC STAGE CLASSIFICATION (pTNM, AJCC 8th Edition)        Primary Tumor (pT)  pT1b   Regional Lymph Nodes (pN)  pN0a            ASSESSMENT AND PLAN:    #Low risk papillary thyroid cancer status post partial lobectomy in 2020  #S/P partial thyroidectomy  -Reviewed course to date in detail with the patient  -FNA 11/11/2020 was suspicious for PTC and surgical pathology 12/17/2020 showed low risk pT1b unifocal tumor of 1.6 cm in maximal diameter, considered low risk  -Completion thyroidectomy and radioactive iodine were not performed due to low risk  -She is remained biochemically euthyroid since surgery, with TSH within postoperative low risk goal of 0.5-2 without any thyroid hormone replacement  -Reviewed plan for surveillance and goals of care  -Recent TSH in January 2025 was 1.660, no indication to start thyroid hormone at this time  -Plan to repeat TFTs and thyroid ultrasound in June 2025, will not obtain thyroglobulin levels as this has limited utility in patients with much of the natural thyroid remaining in vitro  -Will consider reevaluation with ENT if there is any evidence of new nodules or gland growth  -All questions answered in detail    The above plan was discussed in detail with the patient who verbalized understanding and agreement.      Cecilia Bowling DO  [facility]  Endocrinology  1/20/2025     Note to patient: The 21 Century Cures Act makes medical notes like these available to patients in the interest of transparency. However, be advised this is a medical document. It is intended as peer to peer communication. It is written in medical language and may contain abbreviations or verbiage that are unfamiliar. It may appear blunt or direct. Medical documents are intended to carry relevant information, facts as evident, and the clinical opinion of the practitioner.      In reviewing this note, please be advised that Dragon Voice Recognition software used to dictate the note may have made errors in recognizing some of the words or phrases.                    [1] No Known Allergies

## 2025-01-20 NOTE — PATIENT INSTRUCTIONS
Let's do labs and ultrasound in June, otherwise everything looks great.     General follow up information:  Please let us know if you require any refills at least 1 week prior to your medication running out. If you do run out of medication, please call our office ASAP to request refills (do not wait until your follow up).  Please call us if you experience any problems with insurance coverage of medication, lab work, or imaging.   Lab results and imaging will typically be reviewed at follow up appointments, or within 3-5 business days of ALL results being in if you do not have an appointment scheduled in the near future. Our office will contact you for any abnormal results requiring more urgent follow up or action.   The on-call pager is for urgent matters only. If you are a type 1 diabetic and run out of insulin after business hours 8AM-4PM, you may call the on-call pager for a refill to a 24 hour pharmacy. If you have adrenal insufficiency and run out of steroids, you may call the on-call pager for a refill to a 24 hour pharmacy. All other refill requests should be requested during business hours.    Return Visit   [] Dr. Bowling in July 2025

## 2025-03-25 DIAGNOSIS — N94.6 DYSMENORRHEA: ICD-10-CM

## 2025-03-27 RX ORDER — NORETHINDRONE ACETATE AND ETHINYL ESTRADIOL AND FERROUS FUMARATE 1MG-20(21)
1 KIT ORAL DAILY
Qty: 28 TABLET | Refills: 0 | Status: SHIPPED | OUTPATIENT
Start: 2025-03-27 | End: 2025-04-21

## 2025-03-27 NOTE — TELEPHONE ENCOUNTER
Unable to refill per protocol as pt is overdue for annual, LOV 1/15/24. Message already routed to  to schedule.     Connie Vaughn- refill pended if agreeable.

## 2025-03-31 ENCOUNTER — TELEPHONE (OUTPATIENT)
Dept: INTERNAL MEDICINE CLINIC | Facility: CLINIC | Age: 25
End: 2025-03-31

## 2025-03-31 DIAGNOSIS — Z13.228 SCREENING FOR METABOLIC DISORDER: ICD-10-CM

## 2025-03-31 DIAGNOSIS — Z13.220 SCREENING FOR LIPID DISORDERS: ICD-10-CM

## 2025-03-31 DIAGNOSIS — Z13.0 SCREENING FOR BLOOD DISEASE: ICD-10-CM

## 2025-03-31 DIAGNOSIS — Z00.00 ROUTINE GENERAL MEDICAL EXAMINATION AT A HEALTH CARE FACILITY: Primary | ICD-10-CM

## 2025-03-31 NOTE — TELEPHONE ENCOUNTER
Patient called request labs prior to their annual physical.  Annual physical scheduled for 4/28/25  Please order labs. Patient preferred lab is [facility]  Patient informed request was sent to clinical team.  Patient informed to fast for labs.  No callback required.

## 2025-03-31 NOTE — TELEPHONE ENCOUNTER
Addended by: CAITLYN STAPLES on: 5/4/2019 08:22 AM     Modules accepted: Orders     Placed fasting lab orders to [place] lab per protocol.

## 2025-04-21 ENCOUNTER — PATIENT MESSAGE (OUTPATIENT)
Dept: INTERNAL MEDICINE CLINIC | Facility: CLINIC | Age: 25
End: 2025-04-21

## 2025-04-21 ENCOUNTER — OFFICE VISIT (OUTPATIENT)
Dept: INTERNAL MEDICINE CLINIC | Facility: CLINIC | Age: 25
End: 2025-04-21
Payer: COMMERCIAL

## 2025-04-21 VITALS
DIASTOLIC BLOOD PRESSURE: 60 MMHG | RESPIRATION RATE: 18 BRPM | SYSTOLIC BLOOD PRESSURE: 114 MMHG | BODY MASS INDEX: 22.34 KG/M2 | WEIGHT: 150.81 LBS | OXYGEN SATURATION: 98 % | HEART RATE: 79 BPM | HEIGHT: 69 IN | TEMPERATURE: 97 F

## 2025-04-21 DIAGNOSIS — E89.0 S/P PARTIAL THYROIDECTOMY: ICD-10-CM

## 2025-04-21 DIAGNOSIS — Z00.00 ROUTINE GENERAL MEDICAL EXAMINATION AT A HEALTH CARE FACILITY: Primary | ICD-10-CM

## 2025-04-21 DIAGNOSIS — Z85.850 HISTORY OF THYROID CANCER: ICD-10-CM

## 2025-04-21 DIAGNOSIS — Z12.4 SCREENING FOR CERVICAL CANCER: ICD-10-CM

## 2025-04-21 DIAGNOSIS — N94.6 DYSMENORRHEA: ICD-10-CM

## 2025-04-21 DIAGNOSIS — Z11.3 SCREEN FOR STD (SEXUALLY TRANSMITTED DISEASE): ICD-10-CM

## 2025-04-21 PROCEDURE — 87591 N.GONORRHOEAE DNA AMP PROB: CPT | Performed by: NURSE PRACTITIONER

## 2025-04-21 PROCEDURE — 88175 CYTOPATH C/V AUTO FLUID REDO: CPT | Performed by: NURSE PRACTITIONER

## 2025-04-21 PROCEDURE — 87491 CHLMYD TRACH DNA AMP PROBE: CPT | Performed by: NURSE PRACTITIONER

## 2025-04-21 RX ORDER — NORETHINDRONE ACETATE AND ETHINYL ESTRADIOL 1MG-20(21)
1 KIT ORAL DAILY
Qty: 84 TABLET | Refills: 3 | Status: SHIPPED | OUTPATIENT
Start: 2025-04-21

## 2025-04-21 NOTE — PROGRESS NOTES
The following individual(s) verbally consented to be recorded using ambient AI listening technology and understand that they can each withdraw their consent to this listening technology at any point by asking the clinician to turn off or pause the recording:    Patient name: Kylee Mcgowan  Additional names:            Kylee Mcgowan is a 24 year old female who presents for a complete physical exam:       Patient complains of:    History of Present Illness  A 24 year old female who presents for an annual physical exam.    She is overall doing well with no current complaints. She is due for a Pap smear, having had normal results in 2021 and 2022,     She has a history of hypothyroidism following a partial thyroidectomy for thyroid cancer. Her thyroid condition is well-managed, with her last visit in January and a follow-up planned for June.    She uses oral contraceptives for birth control and ran out of her pills yesterday. She has not had sexual intercourse since missing the pill and plans to use condoms for the rest of the month to prevent pregnancy.    Her immunizations are up to date, including a Tdap received in 2020.   [facility]  will need to call for results.      Wt Readings from Last 6 Encounters:   04/21/25 150 lb 12.8 oz (68.4 kg)   01/20/25 169 lb (76.7 kg)   01/15/24 169 lb 3.2 oz (76.7 kg)   03/02/23 159 lb 6.4 oz (72.3 kg)   11/29/22 159 lb (72.1 kg)   06/20/22 145 lb (65.8 kg)       Results        Current Medications[1]   Past Medical History[2]   Past Surgical History[3]   Family History[4]        Health Maintenance  Immunizations:   Immunization History   Administered Date(s) Administered    Covid-19 Vaccine Pfizer 30 mcg/0.3 ml 01/27/2021, 02/17/2021, 10/30/2021    Covid-19 Vaccine Pfizer Bivalent 30mcg/0.3mL 10/13/2022    DTP 01/18/1999, 01/18/1999, 04/13/1999, 04/13/1999, 06/29/1999, 06/29/1999, 08/03/2000, 08/03/2000, 08/23/2004, 08/23/2004    FLUZONE 6 months and older  PFS 0.5 ml (28232) 10/30/2021, 01/15/2024    Flucelvax Influenza vaccine, trivalent (ccIIV3), 0.5mL IM 10/13/2022    HEP B 12/07/1998, 03/04/1999, 09/03/1999    Hpv Virus Vaccine 9 [place] Im 08/31/2011, 08/26/2013, 06/15/2016    IPV 01/18/1999, 04/13/1999, 08/13/2000, 08/23/2004    Influenza 01/18/1999, 03/04/1999, 06/29/1999, 08/26/2013, 11/03/2022    MMR 12/07/1999, 08/23/2004    Meningococcal-Menactra 08/09/2013, 06/15/2016    TDAP 07/22/2010, 08/31/2020    Tb Intradermal Test 11/13/2020, 06/20/2022    Varicella 12/07/1999, 07/22/2010     Dental Visits: yes   Colon cancer screening:  No recommendations at this time   Gyne:     Health Maintenance   Topic Date Due    Pap Smear  11/29/2023            History of dysplasia:  as above  Menstrual Cycle: as above  OCP         LMP:   Symptoms:   Sexually Active:  Yes   Breast Cancer Screening:  No recommendations at this time     Bone Density:  na       REVIEW OF SYSTEMS:   GENERAL: feels well otherwise  SKIN: denies any unusual skin lesions  EYES:denies blurred vision or double vision  HEENT: denies nasal congestion, sinus pain or ST  LUNGS: denies shortness of breath with exertion  CARDIOVASCULAR: denies chest pain on exertion  GI: denies abdominal pain,denies heartburn  : denies dysuria, vaginal discharge or itching  MUSCULOSKELETAL: denies back pain  NEURO: denies headaches  PSYCH: no c/o      EXAM:   /60   Pulse 79   Temp 97.4 °F (36.3 °C)   Resp 18   Ht 5' 9\" (1.753 m)   Wt 150 lb 12.8 oz (68.4 kg)   LMP 03/25/2025 (Approximate)   SpO2 98%   BMI 22.27 kg/m²   Body mass index is 22.27 kg/m².   GENERAL: well developed, well nourished,in no apparent distress  SKIN: no rashes,no suspicious lesions  HEENT: atraumatic, normocephalic,ears and throat are clear  EYES:PERRLA, EOMI, conjunctiva are clear  NECK: supple,no adenopathy,  BREAST: Normal appearance.  No dominant or suspicious mass. No tenderness.    LUNGS: clear to auscultation  CARDIO: RRR without  murmur  GI: good BS's,no masses, HSM or tenderness  : Vagina normal and uterus normal. Normal cervix. Cervix exhibits no motion tenderness and no discharge. Bilateral adnexum display no mass, no tenderness and no fullness.  RECTAL: normal appearance   MUSCULOSKELETAL: back is not tender,  EXTREMITIES: no edema  NEURO: Oriented times three,cranial nerves are intact,motor and sensory are grossly intact    ASSESSMENT AND PLAN:      HEALTH MAINTENANCE:  [facility].  Will need to call for results.      Assessment & Plan  Wellness Visit  Routine wellness visit. Pap smear due. Oral contraceptives ran out. Immunizations current.  - Advised condom use for one month due to contraceptive delay.  - Instructed on staggering missed birth control pills to prevent nausea.  Will obtain labs from [facility]     Hypothyroidism  Managed by Dr. Bowling post-thyroidectomy. Condition stable with regular follow-ups.  - Continue follow-up with Dr. Bowling.      Encounter Diagnoses   Name Primary?    Dysmenorrhea     Routine general medical examination at a health care facility Yes    Screen for STD (sexually transmitted disease)     Screening for cervical cancer     S/P partial thyroidectomy     History of thyroid cancer, papillary 2020         Orders Placed This Encounter   Procedures    ThinPrep Pap with HPV Reflex, Chlamydia/GC    Chlamydia/Gc Amplification       Meds & Refills for this Visit:  Requested Prescriptions     Signed Prescriptions Disp Refills    Norethin Ace-Eth Estrad-FE (SHAWNA FE 1/20) 1-20 MG-MCG Oral Tab 84 tablet 3     Sig: Take 1 tablet by mouth daily.       Imaging & Consults:  None    No follow-ups on file.  There are no Patient Instructions on file for this visit.             [1]   Current Outpatient Medications   Medication Sig Dispense Refill    Norethin Ace-Eth Estrad-FE (SHAWNA FE 1/20) 1-20 MG-MCG Oral Tab Take 1 tablet by mouth daily. 84 tablet 3    Multiple Vitamin (MULTI-VITAMIN) Oral Tab  Take 1 tablet by mouth daily.     [2]   Past Medical History:   Cancer (HCC)    thyroid    Disorder of thyroid    Visual impairment    glasses   [3]   Past Surgical History:  Procedure Laterality Date    Thyroidectomy      partial thyroidectomy - 12/2020    [place] teeth removed  2016   [4]   Family History  Problem Relation Age of Onset    Cancer Paternal Grandmother         intestional

## 2025-04-22 LAB
ABSOLUTE BASOPHILS: 0
ABSOLUTE EOSINOPHILS: 0.1
ABSOLUTE LYMPHOCYTES: 1.9
ALBUMIN: 4.3 G/DL
ALKALINE PHOSPHATASE: 68
AMB EXT BILIRUBIN, TOTAL: 0.5 MG/DL
AMB EXT BUN: 14 MG/DL
AMB EXT CALCIUM: 9.8
AMB EXT CARBON DIOXIDE: 25
AMB EXT CHLORIDE: 103
AMB EXT CHOLESTEROL, TOTAL: 191 MG/DL
AMB EXT CMP ALT: 17 U/L
AMB EXT CMP AST: 26 U/L
AMB EXT CREATININE: 0.99 MG/DL
AMB EXT EGFR NON-AA: 81
AMB EXT GLUCOSE: 98 MG/DL
AMB EXT HDL CHOLESTEROL: 76 MG/DL
AMB EXT HEMATOCRIT: 45.7
AMB EXT HEMOGLOBIN: 15.2
AMB EXT LDL CHOLESTEROL, DIRECT: 106 MG/DL
AMB EXT MCV: 94
AMB EXT PLATELETS: 219
AMB EXT POSTASSIUM: 4.8 MMOL/L
AMB EXT SODIUM: 139 MMOL/L
AMB EXT TRIGLYCERIDES: 46 MG/DL
AMB EXT VLDL: 9 MG/DL
AMB EXT WBC: 4 X10(3)UL
BASOPHILS: 1 %
BUN/CREATININE RATIO: 14
C TRACH DNA SPEC QL NAA+PROBE: NEGATIVE
EOSINOPHILS: 4 %
GLOBULIN: 2.5
IMMATURE GRANULOCYTES: 0
IMMATURE GRANULOCYTES: 0
LYMPHOCYTES: 46 %
MCH: 31.1
MCHC: 33.3 G/DL
MONOCYTES(ABSOLUTE): 0.3 X10E3/UL
MONOCYTES: 7 %
N GONORRHOEA DNA SPEC QL NAA+PROBE: NEGATIVE
NEUTROPHILS (ABSOLUTE): 1.7 X10E3/UL
NEUTROPHILS: 42 %
RBC: 4.89 X 10-6/UL
RDW: 12.1
TOTAL PROTEIN: 6.9

## 2025-04-22 RX ORDER — NORETHINDRONE ACETATE AND ETHINYL ESTRADIOL 1MG-20(21)
1 KIT ORAL DAILY
Qty: 84 TABLET | Refills: 2 | Status: SHIPPED | OUTPATIENT
Start: 2025-04-22

## 2025-04-22 NOTE — TELEPHONE ENCOUNTER
Patient states she has picked up her RX from [facility] and requesting the remaining refills sent to [facility]    LOV 4/21/25    RX sent to [facility] per patient request

## 2025-04-23 ENCOUNTER — PATIENT MESSAGE (OUTPATIENT)
Dept: INTERNAL MEDICINE CLINIC | Facility: CLINIC | Age: 25
End: 2025-04-23

## 2025-04-24 LAB — LAST PAP RESULT: NORMAL

## 2025-05-27 ENCOUNTER — TELEPHONE (OUTPATIENT)
Dept: INTERNAL MEDICINE CLINIC | Facility: CLINIC | Age: 25
End: 2025-05-27

## 2025-05-27 NOTE — TELEPHONE ENCOUNTER
Kylee Mcgowan requesting Medication Refill for:    Medication name and dose (copy and paste from medication list):     Medication Quantity Refills Start End   Norethin Ace-Eth Estrad-FE ([facility] 1/20) 1-20 MG-MCG Oral Tab 84 tablet 2 4/22/2025 --   Sig:   Take 1 tablet by mouth daily.     Route:   Oral     Order #:   [id number]         If medication is not on medication list - transfer patient to RN queue for triage    Preferred Pharmacy:       [facility] - [address] [phone], [phone]   LOV: 4/21/2025   Last Refill date: 4/22/2025  Next Scheduled appointment:   Future Appointments   Date Time Provider Department Center   6/9/2025  1:30 PM [facility]   7/21/2025 11:30 AM Cecilia Bowling DO [id number]

## 2025-05-27 NOTE — TELEPHONE ENCOUNTER
Outpatient Medication Detail     Disp Refills Start End    Norethin Ace-Eth Estrad-FE ([facility] 1/20) 1-20 MG-MCG Oral Tab 84 tablet 2 4/22/2025 --    Sig - Route: Take 1 tablet by mouth daily. - Oral    Sent to pharmacy as: Norethin Ace-Eth Estrad-FE 1-20 MG-MCG Oral Tablet ([facility] 1/20)    E-Prescribing Status: Receipt confirmed by pharmacy (4/22/2025 11:42 AM CDT)      Associated Diagnoses    Dysmenorrhea        Pharmacy    [facility] - [facility] [address] [place] [address] [phone], [phone]

## 2025-06-02 ENCOUNTER — PATIENT MESSAGE (OUTPATIENT)
Facility: CLINIC | Age: 25
End: 2025-06-02

## 2025-06-04 ENCOUNTER — TELEPHONE (OUTPATIENT)
Dept: INTERNAL MEDICINE CLINIC | Facility: CLINIC | Age: 25
End: 2025-06-04

## 2025-06-04 NOTE — TELEPHONE ENCOUNTER
Kylee Mcgowan requesting Medication Refill for:      Pharmacy requests 90 day supply with 3 refills      Medication name and dose (copy and paste from medication list)     Medication Quantity Refills Start End   Norethin Ace-Eth Estrad-FE (SHAWNA JAYSON 1/20) 1-20 MG-MCG Oral Tab 84 tablet 2 4/22/2025 --   Sig:   Take 1 tablet by mouth daily.     Route:   Oral     Order #:   [id number]         Preferred Pharmacy:     [facility] - [address] [phone], [phone]       Last Refill date: 4/22/2025  Next Scheduled appointment:   Future Appointments   Date Time Provider Department Center   7/21/2025 11:30 AM Cecilia Bowling DO [id number] [place]

## 2025-06-04 NOTE — TELEPHONE ENCOUNTER
Prescription already sent 4/22/25 to [id number] with refills    Outpatient Medication Detail     Disp Refills Start End   Norethin Ace-Eth Estrad-FE ([facility] 1/20) 1-20 MG-MCG Oral Tab 84 tablet 2 4/22/2025 --   Sig - Route: Take 1 tablet by mouth daily. - Oral   Sent to pharmacy as: Norethin Ace-Eth Estrad-FE 1-20 MG-MCG Oral Tablet ([facility] 1/20)   E-Prescribing Status: Receipt confirmed by pharmacy (4/22/2025 11:42 AM CDT)   Pharmacy  [facility] - [address] [phone], [phone]

## 2025-06-13 ENCOUNTER — TELEPHONE (OUTPATIENT)
Facility: CLINIC | Age: 25
End: 2025-06-13

## 2025-06-13 NOTE — TELEPHONE ENCOUNTER
25-Patient called into clinic.  Verified name and .  Patient was at [facility] and asked that we fax over labs from Dr. Bowling, Fax#[phone].  Labs were printed and faxed over.

## 2025-06-18 ENCOUNTER — TELEPHONE (OUTPATIENT)
Facility: CLINIC | Age: 25
End: 2025-06-18

## 2025-06-18 NOTE — TELEPHONE ENCOUNTER
Fax from [facility]     Ultrasound was done    Results place in Providers Inbox for review     Next Apt 07/21/2025

## 2025-06-21 ENCOUNTER — PATIENT MESSAGE (OUTPATIENT)
Facility: CLINIC | Age: 25
End: 2025-06-21

## 2025-06-23 ENCOUNTER — TELEPHONE (OUTPATIENT)
Facility: CLINIC | Age: 25
End: 2025-06-23

## 2025-06-27 NOTE — TELEPHONE ENCOUNTER
Sent my chart message. Telephoned patient and left message to call us back.     Closing encounter.

## 2025-06-27 NOTE — TELEPHONE ENCOUNTER
Please let patient know thyroid levels are within excellent range currently, no need for thyroid meds. US thyroid is stable as well with no new or concerning findings. She is totally free to keep her 7/21 appointment if she has any questions, otherwise we can push back to a one year follow up in January. Thanks!

## (undated) DEVICE — 3M™ TEGADERM™ TRANSPARENT FILM DRESSING, 1626W, 4 IN X 4-3/4 IN (10 CM X 12 CM), 50 EACH/CARTON, 4 CARTON/CASE: Brand: 3M™ TEGADERM™

## (undated) DEVICE — SPONGE: SPECIALTY PEANUT XR 100/CS: Brand: MEDICAL ACTION INDUSTRIES

## (undated) DEVICE — CAUTERY NEEDLE 2IN INS E1465

## (undated) DEVICE — HEAD AND NECK CDS-LF: Brand: MEDLINE INDUSTRIES, INC.

## (undated) DEVICE — PAD SACRAL SPAN AID

## (undated) DEVICE — SUTURE VICRYL 3-0 SH

## (undated) DEVICE — HARMONIC FOCUS SHEARS 9CM LENGTH + ADAPTIVE TISSUE TECHNOLOGY FOR USE WITH BLUE HAND PIECE ONLY: Brand: HARMONIC FOCUS

## (undated) DEVICE — LIGHT HANDLE

## (undated) DEVICE — SUTURE PROLENE 4-0 PS-2

## (undated) DEVICE — ARISTA 1 GRAM

## (undated) DEVICE — UNDYED BRAIDED (POLYGLACTIN 910), SYNTHETIC ABSORBABLE SUTURE: Brand: COATED VICRYL

## (undated) DEVICE — KENDALL SCD EXPRESS SLEEVES, KNEE LENGTH, MEDIUM: Brand: KENDALL SCD

## (undated) DEVICE — SOL  .9 1000ML BTL

## (undated) DEVICE — SPONGE RAYTEC 4X4 RF DETECT

## (undated) DEVICE — STERILE POLYISOPRENE POWDER-FREE SURGICAL GLOVES: Brand: PROTEXIS

## (undated) DEVICE — SUTURE SILK 3-0

## (undated) DEVICE — AIRWAY ENDO  TRIVANTAGE 7MM

## (undated) DEVICE — 3M™ STERI-STRIP™ REINFORCED ADHESIVE SKIN CLOSURES, R1547, 1/2 IN X 4 IN (12 MM X 100 MM), 6 STRIPS/ENVELOPE: Brand: 3M™ STERI-STRIP™

## (undated) DEVICE — PREMIUM WET SKIN PREP TRAY: Brand: MEDLINE INDUSTRIES, INC.

## (undated) DEVICE — GAUZE SPONGES,USP TYPE VII GAUZE, 12 PLY: Brand: CURITY

## (undated) DEVICE — PROBE 8225101 5PK STD PRASS FL TIP ROHS

## (undated) DEVICE — SUTURE SILK 2-0

## (undated) NOTE — LETTER
11/15/20    11/15/20      This document is to verify Mini Mejia had a TB skin test performed and the results were: NEGATIVE    Date given: 11/13/2020  Date read: 11/15/20        Please call the number listed above if you have further questions.       Dolores Huertas

## (undated) NOTE — LETTER
09/05/19        [facility] [address]      Dear Mary Mendoza records indicate that you have outstanding lab work and or testing that was ordered for you and has not yet been completed:  Orders Placed This Encounter

## (undated) NOTE — LETTER
08/05/19        [facility] [address]      Dear Mandeep Jimenez records indicate that you have outstanding lab work and or testing that was ordered for you and has not yet been completed:  Orders Placed This Encounter

## (undated) NOTE — MR AVS SNAPSHOT
After Visit Summary   11/20/2021    Deniece Halsted   MRN: [id number]           Visit Information     Date & Time  11/20/2021 11:00 AM Provider  [facility] Department  [address] [place], [facility] Dept.  Phone  [phone] PAP SMEAR B [[id number] CUSTOM]  11/20/2021 11/20/2022      Future Appointments        Provider Department    11/24/2021 9:45 AM Aric Christensen - [facility] V[facility]      Follow-up Instructions    Return in about 1 year (around 11/20/2022).

## (undated) NOTE — MR AVS SNAPSHOT
After Visit Summary   12/30/2021    Nga Arceo   MRN: [id number]           Visit Information     Date & Time  12/30/2021  9:40 AM Provider  [facility] Brennan Department  [facility] [address] [place] Dept.  Phone  [phone] improvements. Your feedback will help us do so. For more information on [facility], please visit [email or web address]         No text in SmartText       No text in SmartText

## (undated) NOTE — LETTER
11/19/21        [id number]      Dear Josette Pena,    [facility] records indicate that you have outstanding lab work and or testing that was ordered for you and has not yet been completed:  Orders Placed This Encounter